# Patient Record
Sex: MALE | Race: WHITE | URBAN - METROPOLITAN AREA
[De-identification: names, ages, dates, MRNs, and addresses within clinical notes are randomized per-mention and may not be internally consistent; named-entity substitution may affect disease eponyms.]

---

## 2018-01-29 ENCOUNTER — NEW PATIENT (OUTPATIENT)
Dept: URBAN - METROPOLITAN AREA CLINIC 44 | Facility: CLINIC | Age: 83
End: 2018-01-29
Payer: OTHER GOVERNMENT

## 2018-01-29 DIAGNOSIS — H40.1134 PRIMARY OPEN-ANGLE GLAUCOMA, INDETERMINATE, BILATERAL: ICD-10-CM

## 2018-01-29 PROCEDURE — 92134 CPTRZ OPH DX IMG PST SGM RTA: CPT | Performed by: OPTOMETRIST

## 2018-01-29 PROCEDURE — 92004 COMPRE OPH EXAM NEW PT 1/>: CPT | Performed by: OPTOMETRIST

## 2018-01-29 ASSESSMENT — VISUAL ACUITY
OS: 20/50
OD: 20/30

## 2018-01-29 ASSESSMENT — KERATOMETRY
OD: 43.50
OS: 43.00

## 2018-01-29 ASSESSMENT — INTRAOCULAR PRESSURE
OD: 10
OS: 10

## 2018-01-30 ENCOUNTER — FOLLOW UP ESTABLISHED (OUTPATIENT)
Dept: URBAN - METROPOLITAN AREA CLINIC 44 | Facility: CLINIC | Age: 83
End: 2018-01-30
Payer: OTHER GOVERNMENT

## 2018-01-30 DIAGNOSIS — H43.391 OTHER VITREOUS OPACITIES, RIGHT EYE: Primary | ICD-10-CM

## 2018-01-30 DIAGNOSIS — T85.22XA DISPLACEMENT OF INTRAOCULAR LENS, INITIAL ENCOUNTER: ICD-10-CM

## 2018-01-30 PROCEDURE — 92004 COMPRE OPH EXAM NEW PT 1/>: CPT | Performed by: OPHTHALMOLOGY

## 2018-01-30 PROCEDURE — 92134 CPTRZ OPH DX IMG PST SGM RTA: CPT | Performed by: OPHTHALMOLOGY

## 2018-01-30 ASSESSMENT — INTRAOCULAR PRESSURE
OD: 12
OS: 11

## 2018-02-02 ENCOUNTER — DOCUMENTATION ONLY (OUTPATIENT)
Dept: FAMILY MEDICINE | Facility: OTHER | Age: 83
End: 2018-02-02

## 2018-02-02 RX ORDER — SIMVASTATIN 40 MG
40 TABLET ORAL AT BEDTIME
COMMUNITY
Start: 2014-09-23 | End: 2020-07-04

## 2018-02-02 RX ORDER — RISPERIDONE 4 MG/1
4 TABLET ORAL 2 TIMES DAILY
COMMUNITY
Start: 2014-09-23 | End: 2020-07-04 | Stop reason: DRUGHIGH

## 2018-02-02 RX ORDER — LEVOTHYROXINE SODIUM 100 UG/1
100 TABLET ORAL
COMMUNITY
Start: 2014-09-23 | End: 2020-07-04

## 2018-02-02 RX ORDER — AMITRIPTYLINE HYDROCHLORIDE 75 MG/1
75 TABLET ORAL AT BEDTIME
COMMUNITY
Start: 2014-09-23 | End: 2020-07-04 | Stop reason: DRUGHIGH

## 2018-02-02 RX ORDER — LAMOTRIGINE 200 MG/1
200 TABLET ORAL DAILY
COMMUNITY
Start: 2014-09-23 | End: 2020-07-04 | Stop reason: DRUGHIGH

## 2018-02-02 RX ORDER — ARIPIPRAZOLE 10 MG/1
10 TABLET ORAL DAILY
COMMUNITY
Start: 2014-09-23 | End: 2020-07-04

## 2018-02-02 RX ORDER — METOPROLOL SUCCINATE 200 MG/1
200 TABLET, EXTENDED RELEASE ORAL DAILY
COMMUNITY
Start: 2014-09-23 | End: 2020-07-04

## 2018-02-02 RX ORDER — GENTAMICIN SULFATE 1 MG/G
OINTMENT TOPICAL
COMMUNITY
Start: 2014-09-23 | End: 2020-07-04

## 2018-02-08 ENCOUNTER — FOLLOW UP ESTABLISHED (OUTPATIENT)
Dept: URBAN - METROPOLITAN AREA CLINIC 44 | Facility: CLINIC | Age: 83
End: 2018-02-08
Payer: OTHER GOVERNMENT

## 2018-02-08 PROCEDURE — 92014 COMPRE OPH EXAM EST PT 1/>: CPT | Performed by: OPHTHALMOLOGY

## 2018-02-08 RX ORDER — OFLOXACIN 3 MG/ML
0.3 % SOLUTION/ DROPS OPHTHALMIC
Qty: 1 | Refills: 0 | Status: INACTIVE
Start: 2018-02-08 | End: 2018-03-20

## 2018-02-08 RX ORDER — PREDNISOLONE ACETATE 10 MG/ML
1 % SUSPENSION/ DROPS OPHTHALMIC
Qty: 1 | Refills: 0 | Status: INACTIVE
Start: 2018-02-08 | End: 2020-02-08

## 2018-02-08 ASSESSMENT — INTRAOCULAR PRESSURE
OD: 14
OS: 15

## 2018-02-22 ENCOUNTER — Encounter (OUTPATIENT)
Dept: URBAN - METROPOLITAN AREA CLINIC 44 | Facility: CLINIC | Age: 83
End: 2018-02-22
Payer: OTHER GOVERNMENT

## 2018-02-22 DIAGNOSIS — Z01.818 ENCOUNTER FOR OTHER PREPROCEDURAL EXAMINATION: Primary | ICD-10-CM

## 2018-02-22 PROCEDURE — 99213 OFFICE O/P EST LOW 20 MIN: CPT | Performed by: PHYSICIAN ASSISTANT

## 2018-02-22 RX ORDER — SIMVASTATIN 40 MG/1
40 MG TABLET, FILM COATED ORAL
Qty: 0 | Refills: 0 | Status: INACTIVE
Start: 2018-02-22 | End: 2018-02-22

## 2018-02-22 RX ORDER — LAMOTRIGINE 200 MG/1
200 MG TABLET ORAL
Qty: 0 | Refills: 0 | Status: INACTIVE
Start: 2018-02-22 | End: 2018-08-17

## 2018-02-22 RX ORDER — RISPERIDONE 2 MG/1
2 MG TABLET, FILM COATED ORAL
Qty: 0 | Refills: 0 | Status: INACTIVE
Start: 2018-02-22 | End: 2018-04-11

## 2018-02-22 RX ORDER — AMITRIPTYLINE HYDROCHLORIDE 75 MG/1
75 MG TABLET, FILM COATED ORAL
Qty: 0 | Refills: 0 | Status: INACTIVE
Start: 2018-02-22 | End: 2018-08-17

## 2018-02-22 RX ORDER — METOPROLOL SUCCINATE 100 MG/1
100 MG TABLET, EXTENDED RELEASE ORAL
Qty: 0 | Refills: 0 | Status: INACTIVE
Start: 2018-02-22 | End: 2018-08-17

## 2018-03-07 ENCOUNTER — SURGERY (OUTPATIENT)
Dept: URBAN - METROPOLITAN AREA SURGERY 5 | Facility: SURGERY | Age: 83
End: 2018-03-07
Payer: OTHER GOVERNMENT

## 2018-03-07 PROCEDURE — 66825 REPOSITION INTRAOCULAR LENS: CPT | Performed by: OPHTHALMOLOGY

## 2018-03-07 PROCEDURE — 67036 REMOVAL OF INNER EYE FLUID: CPT | Performed by: OPHTHALMOLOGY

## 2018-03-08 ENCOUNTER — POST OP (OUTPATIENT)
Dept: URBAN - METROPOLITAN AREA CLINIC 44 | Facility: CLINIC | Age: 83
End: 2018-03-08

## 2018-03-08 PROCEDURE — 99024 POSTOP FOLLOW-UP VISIT: CPT | Performed by: OPTOMETRIST

## 2018-03-08 ASSESSMENT — INTRAOCULAR PRESSURE: OS: 13

## 2018-03-20 ENCOUNTER — POST OP (OUTPATIENT)
Dept: URBAN - METROPOLITAN AREA CLINIC 24 | Facility: CLINIC | Age: 83
End: 2018-03-20

## 2018-03-20 PROCEDURE — 99024 POSTOP FOLLOW-UP VISIT: CPT | Performed by: OPHTHALMOLOGY

## 2018-03-20 ASSESSMENT — INTRAOCULAR PRESSURE
OS: 24
OD: 17

## 2018-03-23 ENCOUNTER — HOSPITAL ENCOUNTER (OUTPATIENT)
Dept: CARDIOLOGY | Facility: OTHER | Age: 83
Discharge: HOME | End: 2018-03-23
Attending: NURSE PRACTITIONER | Admitting: NURSE PRACTITIONER
Payer: COMMERCIAL

## 2018-03-23 DIAGNOSIS — I35.1 NONRHEUMATIC AORTIC VALVE INSUFFICIENCY: ICD-10-CM

## 2018-03-23 PROCEDURE — 93306 TTE W/DOPPLER COMPLETE: CPT

## 2018-03-23 PROCEDURE — 93306 TTE W/DOPPLER COMPLETE: CPT | Mod: 26 | Performed by: INTERNAL MEDICINE

## 2018-03-23 PROCEDURE — 25500064 ZZH RX 255 OP 636

## 2018-03-23 RX ADMIN — PERFLUTREN 3 ML: 6.52 INJECTION, SUSPENSION INTRAVENOUS at 12:00

## 2018-05-11 ENCOUNTER — HOSPITAL ENCOUNTER (EMERGENCY)
Facility: OTHER | Age: 83
Discharge: HOME | End: 2018-05-11
Attending: PHYSICIAN ASSISTANT | Admitting: PHYSICIAN ASSISTANT
Payer: COMMERCIAL

## 2018-05-11 VITALS
TEMPERATURE: 95.8 F | SYSTOLIC BLOOD PRESSURE: 153 MMHG | WEIGHT: 162 LBS | BODY MASS INDEX: 23.19 KG/M2 | RESPIRATION RATE: 18 BRPM | DIASTOLIC BLOOD PRESSURE: 82 MMHG | OXYGEN SATURATION: 97 % | HEIGHT: 70 IN

## 2018-05-11 DIAGNOSIS — L03.119 CELLULITIS OF WRIST: ICD-10-CM

## 2018-05-11 PROCEDURE — 99283 EMERGENCY DEPT VISIT LOW MDM: CPT | Mod: Z6 | Performed by: PHYSICIAN ASSISTANT

## 2018-05-11 PROCEDURE — 99283 EMERGENCY DEPT VISIT LOW MDM: CPT | Performed by: PHYSICIAN ASSISTANT

## 2018-05-11 PROCEDURE — 87070 CULTURE OTHR SPECIMN AEROBIC: CPT | Performed by: PHYSICIAN ASSISTANT

## 2018-05-11 RX ORDER — SULFAMETHOXAZOLE/TRIMETHOPRIM 800-160 MG
1 TABLET ORAL 2 TIMES DAILY
Qty: 20 TABLET | Refills: 0 | Status: SHIPPED | OUTPATIENT
Start: 2018-05-11 | End: 2020-07-04

## 2018-05-11 RX ORDER — SULFAMETHOXAZOLE/TRIMETHOPRIM 800-160 MG
1 TABLET ORAL 2 TIMES DAILY
Qty: 20 TABLET | Refills: 0 | Status: SHIPPED | OUTPATIENT
Start: 2018-05-11 | End: 2018-05-11

## 2018-05-11 ASSESSMENT — ENCOUNTER SYMPTOMS
NECK STIFFNESS: 0
ABDOMINAL PAIN: 0
CONFUSION: 0
FEVER: 0
EYE REDNESS: 0
COLOR CHANGE: 0
SHORTNESS OF BREATH: 0
DIFFICULTY URINATING: 0
HEADACHES: 0
ARTHRALGIAS: 0
CHILLS: 0

## 2018-05-11 NOTE — ED AVS SNAPSHOT
[facility]    [address]    [place] [address]    Phone:  [phone]    Fax:  [phone]                                       Tashi Santana   MRN: [id number]    Department:  [facility]   Date of Visit:  5/11/2018           Patient Information     Date Of Birth          1935        Your diagnoses for this visit were:     Cellulitis of wrist right       You were seen by Omega Britton PA-C.      Follow-up Information     Follow up with Clinic, [facility]. Schedule an appointment as soon as possible for a visit in 1 week.    Why:  For wound re-check    Contact information:    [address] [address] [phone]          Discharge Instructions         Discharge Instructions for Cellulitis  You have been diagnosed with cellulitis. This is an infection in the deepest layer of the skin. In some cases, the infection also affects the muscle. Cellulitis is caused by bacteria. The bacteria can enter the body through broken skin. This can happen with a cut, scratch, animal bite, or an insect bite that has been scratched. You may have been treated in the hospital with antibiotics and fluids. You will likely be given a prescription for antibiotics to take at home. This sheet will help you take care of yourself at home.  Home care  When you are home:    Take the prescribed antibiotic medicine you are given as directed until it is gone. Take it even if you feel better. It treats the infection and stops it from returning. Not taking all the medicine can make future infections hard to treat.    Keep the infected area clean.    When possible, raise the infected area above the level of your heart. This helps keep swelling down.    Talk with your healthcare provider if you are in pain. Ask what kind of over-the-counter medicine you can take for pain.    Apply clean bandages as advised.    Take your temperature once a day for a  week.    Wash your hands often to prevent spreading the infection.  In the future, wash your hands before and after you touch cuts, scratches, or bandages. This will help prevent infection.   When to call your healthcare provider  Call your healthcare provider immediately if you have any of the following:    Difficulty or pain when moving the joints above or below the infected area    Discharge or pus draining from the area    Fever of 100.4 F (38 C) or higher, or as directed by your healthcare provider    Pain that gets worse in or around the infected     Redness that gets worse in or around the infected area, particularly if the area of redness expands to a wider area    Shaking chills    Swelling of the infected area    Vomiting   Date Last Reviewed: 8/1/2016 2000-2017 [employer]. [address] [place] [address], [place] [address]. All rights reserved. This information is not intended as a substitute for professional medical care. Always follow your healthcare professional's instructions.          24 Hour Appointment Hotline       To make an appointment at any [facility], call [phone] ([phone]). If you don't have a family doctor or clinic, we will help you find one. [facility] clinics are conveniently located to serve the needs of you and your family.             Review of your medicines      START taking        Dose / Directions Last dose taken    sulfamethoxazole-trimethoprim 800-160 MG per tablet   Commonly known as:  BACTRIM DS   Dose:  1 tablet   Quantity:  20 tablet        Take 1 tablet by mouth 2 times daily   Refills:  0          Our records show that you are taking the medicines listed below. If these are incorrect, please call your family doctor or clinic.        Dose / Directions Last dose taken    ALPRAZolam 0.25 MG tablet   Commonly known as:  XANAX   Dose:  0.25 mg   Quantity:  90 tablet        Take 1 tablet (0.25 mg) by mouth every 4 hours as needed for anxiety   Refills:   0        * amitriptyline 75 MG tablet   Commonly known as:  ELAVIL   Dose:  150 mg        Take 150 mg by mouth At Bedtime   Refills:  0        * amitriptyline 75 MG tablet   Commonly known as:  ELAVIL   Dose:  75 mg        Take 75 mg by mouth At Bedtime   Refills:  0        * ARIPiprazole 10 MG tablet   Commonly known as:  ABILIFY   Dose:  5 mg        Take 5 mg by mouth daily   Refills:  0        * ARIPiprazole 10 MG tablet   Commonly known as:  ABILIFY   Dose:  10 mg        Take 10 mg by mouth daily   Refills:  0        gentamicin 0.1 % ointment   Commonly known as:  GARAMYCIN        Refills:  0        * lamoTRIgine 200 MG tablet   Commonly known as:  LaMICtal   Dose:  200 mg        Take 200 mg by mouth 2 times daily.   Refills:  0        * lamoTRIgine 200 MG tablet   Commonly known as:  LaMICtal   Dose:  200 mg        Take 200 mg by mouth daily   Refills:  0        * levothyroxine 100 MCG tablet   Commonly known as:  SYNTHROID/LEVOTHROID   Dose:  100 mcg        Take 100 mcg by mouth daily.   Refills:  0        * levothyroxine 100 MCG tablet   Commonly known as:  SYNTHROID/LEVOTHROID   Dose:  100 mcg        Take 100 mcg by mouth every morning (before breakfast)   Refills:  0        * metoprolol succinate 200 MG 24 hr tablet   Commonly known as:  TOPROL-XL   Dose:  100 mg        Take 100 mg by mouth daily   Refills:  0        * metoprolol succinate 200 MG 24 hr tablet   Commonly known as:  TOPROL-XL   Dose:  200 mg        Take 200 mg by mouth daily   Refills:  0        * risperiDONE 4 MG tablet   Commonly known as:  risperDAL   Dose:  2 mg        Take 2 mg by mouth 2 times daily   Refills:  0        * risperiDONE 4 MG tablet   Commonly known as:  risperDAL   Dose:  4 mg        Take 4 mg by mouth 2 times daily   Refills:  0        * simvastatin 40 MG tablet   Commonly known as:  ZOCOR   Dose:  20 mg        Take 20 mg by mouth At Bedtime   Refills:  0        * simvastatin 40 MG tablet   Commonly known as:  ZOCOR    Dose:  40 mg        Take 40 mg by mouth At Bedtime   Refills:  0        * Notice:  This list has 14 medication(s) that are the same as other medications prescribed for you. Read the directions carefully, and ask your doctor or other care provider to review them with you.            Prescriptions were sent or printed at these locations (1 Prescription)                   [id number] - [place], [place] - [address]   [address] [place] [address]    Telephone:  [phone]   Fax:  [phone]   Hours:                  Printed at Department/Unit printer (1 of 1)         sulfamethoxazole-trimethoprim (BACTRIM DS) 800-160 MG per tablet                Procedures and tests performed during your visit     Wound Culture Aerobic Bacterial      Orders Needing Specimen Collection     None      Pending Results     No orders found from 5/9/2018 to 5/12/2018.            Pending Culture Results     No orders found from 5/9/2018 to 5/12/2018.            Pending Results Instructions     If you had any lab results that were not finalized at the time of your Discharge, you can call the ED Lab Result RN at [phone]. You will be contacted by this team for any positive Lab results or changes in treatment. The nurses are available 7 days a week from 10A to 6:30P.  You can leave a message 24 hours per day and they will return your call.        Thank you for choosing [facility]       Thank you for choosing [facility] for your care. Our goal is always to provide you with excellent care. Hearing back from our patients is one way we can continue to improve our services. Please take a few minutes to complete the written survey that you may receive in the mail after you visit with us. Thank you!        [employer] Information     [employer] lets you send messages to your doctor, view your test results, renew your prescriptions, schedule appointments and more. To sign up, go to [email or web address] .  "Click on \"Log in\" on the left side of the screen, which will take you to the Welcome page. Then click on \"Sign up Now\" on the right side of the page.     You will be asked to enter the access code listed below, as well as some personal information. Please follow the directions to create your username and password.     Your access code is: [email or web address]  Expires: 2018  6:03 PM     Your access code will  in 90 days. If you need help or a new code, please call your [facility] clinic or [phone].        Care EveryWhere ID     This is your Care EveryWhere ID. This could be used by other organizations to access your [facility] medical records  [id number]        Equal Access to Services     JUSTIN NARAYAN : Tristen Leyva, félix barrientos, bharathi jordan, karrie obrien. So [facility] [phone].    ATENCIÓN: Si habla español, tiene a dejesus disposición servicios gratuitos de asistencia lingüística. Llame al [phone].    We comply with applicable federal civil rights laws and [place] laws. We do not discriminate on the basis of race, color, national origin, age, disability, sex, sexual orientation, or gender identity.            After Visit Summary       This is your record. Keep this with you and show to your community pharmacist(s) and doctor(s) at your next visit.                  "

## 2018-05-11 NOTE — DISCHARGE INSTRUCTIONS
Discharge Instructions for Cellulitis  You have been diagnosed with cellulitis. This is an infection in the deepest layer of the skin. In some cases, the infection also affects the muscle. Cellulitis is caused by bacteria. The bacteria can enter the body through broken skin. This can happen with a cut, scratch, animal bite, or an insect bite that has been scratched. You may have been treated in the hospital with antibiotics and fluids. You will likely be given a prescription for antibiotics to take at home. This sheet will help you take care of yourself at home.  Home care  When you are home:    Take the prescribed antibiotic medicine you are given as directed until it is gone. Take it even if you feel better. It treats the infection and stops it from returning. Not taking all the medicine can make future infections hard to treat.    Keep the infected area clean.    When possible, raise the infected area above the level of your heart. This helps keep swelling down.    Talk with your healthcare provider if you are in pain. Ask what kind of over-the-counter medicine you can take for pain.    Apply clean bandages as advised.    Take your temperature once a day for a week.    Wash your hands often to prevent spreading the infection.  In the future, wash your hands before and after you touch cuts, scratches, or bandages. This will help prevent infection.   When to call your healthcare provider  Call your healthcare provider immediately if you have any of the following:    Difficulty or pain when moving the joints above or below the infected area    Discharge or pus draining from the area    Fever of 100.4 F (38 C) or higher, or as directed by your healthcare provider    Pain that gets worse in or around the infected     Redness that gets worse in or around the infected area, particularly if the area of redness expands to a wider area    Shaking chills    Swelling of the infected area    Vomiting   Date Last Reviewed:  8/1/2016 2000-2017 [employer]. [address] [place] [address], [place] [address]. All rights reserved. This information is not intended as a substitute for professional medical care. Always follow your healthcare professional's instructions.

## 2018-05-11 NOTE — ED PROVIDER NOTES
History     Chief Complaint   Patient presents with     Wound Check     HPI Comments: This is a 82 yo male who [facility]  He has noticed some continued redness and drainage at time.  He thinks this is pus.  Denies any fever or chills.  He is here for further evaluation at this time.     The history is provided by the patient.       Problem List:    Patient Active Problem List    Diagnosis Date Noted     Generalized muscle weakness 07/18/2014     Priority: Medium     Weakness 07/18/2014     Priority: Medium     Squamous cell carcinoma of skin of face 07/06/2012     Priority: Medium     Problem list name updated by automated process. Provider to review          Past Medical History:    No past medical history on file.    Past Surgical History:    No past surgical history on file.    Family History:    No family history on file.    Social History:  Marital Status:   [2]  Social History   Substance Use Topics     Smoking status: Former Smoker     Smokeless tobacco: Never Used     Alcohol use No        Medications:      sulfamethoxazole-trimethoprim (BACTRIM DS) 800-160 MG per tablet   ALPRAZolam (XANAX) 0.25 MG tablet   amitriptyline (ELAVIL) 75 MG tablet   amitriptyline (ELAVIL) 75 MG tablet   ARIPiprazole (ABILIFY) 10 MG tablet   ARIPiprazole (ABILIFY) 10 MG tablet   gentamicin (GARAMYCIN) 0.1 % ointment   lamoTRIgine (LAMICTAL) 200 MG tablet   lamoTRIgine (LAMICTAL) 200 MG tablet   levothyroxine (SSYNTHROID,LEVOTHROID) 100 MCG tablet   levothyroxine (SYNTHROID/LEVOTHROID) 100 MCG tablet   metoprolol (TOPROL-XL) 200 MG 24 hr tablet   metoprolol succinate (TOPROL-XL) 200 MG 24 hr tablet   risperiDONE (RISPERDAL) 4 MG tablet   risperiDONE (RISPERDAL) 4 MG tablet   simvastatin (ZOCOR) 40 MG tablet   simvastatin (ZOCOR) 40 MG tablet         Review of Systems   Constitutional: Negative for chills and fever.   HENT: Negative for congestion.    Eyes:  "Negative for redness.   Respiratory: Negative for shortness of breath.    Cardiovascular: Negative for chest pain.   Gastrointestinal: Negative for abdominal pain.   Genitourinary: Negative for difficulty urinating.   Musculoskeletal: Negative for arthralgias and neck stiffness.        Right wrist redness and drainage   Skin: Negative for color change.   Neurological: Negative for headaches.   Psychiatric/Behavioral: Negative for confusion.       Physical Exam   BP: 153/82  Heart Rate: 71  Temp: 95.8  F (35.4  C)  Resp: 18  Height: 177.8 cm (5' 10\")  Weight: 73.5 kg (162 lb)  SpO2: 97 %      Physical Exam   Constitutional: He is oriented to person, place, and time. No distress.   HENT:   Head: Atraumatic.   Mouth/Throat: Oropharynx is clear and moist. No oropharyngeal exudate.   Eyes: Pupils are equal, round, and reactive to light. No scleral icterus.   Cardiovascular: Normal heart sounds and intact distal pulses.    Pulmonary/Chest: Breath sounds normal. No respiratory distress.   Abdominal: Soft. Bowel sounds are normal. There is no tenderness.   Musculoskeletal: He exhibits no edema or tenderness.   Right wrist dorsal skin punch biopsy with minimal drainage and erythema.  WC obtained and pending.  CMSx4   Neurological: He is alert and oriented to person, place, and time.   Skin: Skin is warm. No rash noted. He is not diaphoretic.       ED Course     ED Course     Procedures           WC obtained and pending.   No results found for this or any previous visit (from the past 24 hour(s)).    Medications - No data to display    Assessments & Plan (with Medical Decision Making)     I have reviewed the nursing notes.    I have reviewed the findings, diagnosis, plan and need for follow up with the patient.      New Prescriptions    SULFAMETHOXAZOLE-TRIMETHOPRIM (BACTRIM DS) 800-160 MG PER TABLET    Take 1 tablet by mouth 2 times daily       Final diagnoses:   Cellulitis of wrist - right     Right wrist dorsal cellulitis " from recent punch biopsy.  WC obtained and pending.  Will start him on bactrim in the interim.  Continue to monitor and follow up for a wound check in one week, sooner if there are any concerns.   5/11/2018   [facility]  05/11/18 1811

## 2018-05-11 NOTE — ED AVS SNAPSHOT
[facility]    [address]    [place] [address]    Phone:  [phone]    Fax:  [phone]                                       Tashi Santana   MRN: [id number]    Department:  [facility]   Date of Visit:  5/11/2018           After Visit Summary Signature Page     I have received my discharge instructions, and my questions have been answered. I have discussed any challenges I see with this plan with the nurse or doctor.    ..........................................................................................................................................  Patient/Patient Representative Signature      ..........................................................................................................................................  Patient Representative Print Name and Relationship to Patient    ..................................................               ................................................  Date                                            Time    ..........................................................................................................................................  Reviewed by Signature/Title    ...................................................              ..............................................  Date                                                            Time

## 2018-05-11 NOTE — ED TRIAGE NOTES
Pt reports he had a biopsy on a sore and possible cancerous spot on his L wrist on May 1. He has been getting some pus from the biopsy site and is worried about the sore getting bigger. The wound is also pink, which is new within the past few days. The site is scabbed.

## 2018-05-13 LAB
BACTERIA SPEC CULT: NORMAL
SPECIMEN SOURCE: NORMAL

## 2018-05-18 ENCOUNTER — APPOINTMENT (OUTPATIENT)
Dept: GENERAL RADIOLOGY | Facility: OTHER | Age: 83
End: 2018-05-18
Attending: FAMILY MEDICINE
Payer: COMMERCIAL

## 2018-05-18 ENCOUNTER — HOSPITAL ENCOUNTER (EMERGENCY)
Facility: OTHER | Age: 83
Discharge: ANOTHER HOSPITAL | End: 2018-05-18
Attending: FAMILY MEDICINE | Admitting: FAMILY MEDICINE
Payer: COMMERCIAL

## 2018-05-18 ENCOUNTER — APPOINTMENT (OUTPATIENT)
Dept: CT IMAGING | Facility: OTHER | Age: 83
End: 2018-05-18
Attending: EMERGENCY MEDICINE
Payer: COMMERCIAL

## 2018-05-18 VITALS
RESPIRATION RATE: 23 BRPM | HEART RATE: 74 BPM | BODY MASS INDEX: 23.19 KG/M2 | TEMPERATURE: 96.7 F | OXYGEN SATURATION: 96 % | WEIGHT: 162 LBS | HEIGHT: 70 IN | SYSTOLIC BLOOD PRESSURE: 133 MMHG | DIASTOLIC BLOOD PRESSURE: 76 MMHG

## 2018-05-18 DIAGNOSIS — R06.09 EXERTIONAL DYSPNEA: ICD-10-CM

## 2018-05-18 DIAGNOSIS — R29.6 FALLS FREQUENTLY: ICD-10-CM

## 2018-05-18 DIAGNOSIS — N28.89 LEFT RENAL MASS: ICD-10-CM

## 2018-05-18 LAB
ALBUMIN SERPL-MCNC: 4.2 G/DL (ref 3.5–5.7)
ALBUMIN UR-MCNC: NEGATIVE MG/DL
ALP SERPL-CCNC: 102 U/L (ref 34–104)
ALT SERPL W P-5'-P-CCNC: 16 U/L (ref 7–52)
ANION GAP SERPL CALCULATED.3IONS-SCNC: 8 MMOL/L (ref 3–14)
APPEARANCE UR: CLEAR
AST SERPL W P-5'-P-CCNC: 31 U/L (ref 13–39)
BASOPHILS # BLD AUTO: 0 10E9/L (ref 0–0.2)
BASOPHILS NFR BLD AUTO: 0.5 %
BILIRUB SERPL-MCNC: 0.7 MG/DL (ref 0.3–1)
BILIRUB UR QL STRIP: NEGATIVE
BUN SERPL-MCNC: 16 MG/DL (ref 7–25)
CALCIUM SERPL-MCNC: 9.8 MG/DL (ref 8.6–10.3)
CHLORIDE SERPL-SCNC: 102 MMOL/L (ref 98–107)
CO2 SERPL-SCNC: 25 MMOL/L (ref 21–31)
COLOR UR AUTO: YELLOW
CREAT SERPL-MCNC: 1.28 MG/DL (ref 0.7–1.3)
DIFFERENTIAL METHOD BLD: NORMAL
EOSINOPHIL # BLD AUTO: 0.2 10E9/L (ref 0–0.7)
EOSINOPHIL NFR BLD AUTO: 2.6 %
ERYTHROCYTE [DISTWIDTH] IN BLOOD BY AUTOMATED COUNT: 13 % (ref 10–15)
GFR SERPL CREATININE-BSD FRML MDRD: 54 ML/MIN/1.7M2
GLUCOSE SERPL-MCNC: 103 MG/DL (ref 70–105)
GLUCOSE UR STRIP-MCNC: NEGATIVE MG/DL
HCT VFR BLD AUTO: 41.4 % (ref 40–53)
HGB BLD-MCNC: 14 G/DL (ref 13.3–17.7)
HGB UR QL STRIP: NEGATIVE
IMM GRANULOCYTES # BLD: 0.1 10E9/L (ref 0–0.4)
IMM GRANULOCYTES NFR BLD: 1.2 %
INR PPP: 1.05 (ref 0–1.3)
KETONES UR STRIP-MCNC: NEGATIVE MG/DL
LACTATE SERPL-SCNC: 1.1 MMOL/L (ref 0.5–2.2)
LEUKOCYTE ESTERASE UR QL STRIP: NEGATIVE
LYMPHOCYTES # BLD AUTO: 1.3 10E9/L (ref 0.8–5.3)
LYMPHOCYTES NFR BLD AUTO: 19.3 %
MCH RBC QN AUTO: 30.4 PG (ref 26.5–33)
MCHC RBC AUTO-ENTMCNC: 33.8 G/DL (ref 31.5–36.5)
MCV RBC AUTO: 90 FL (ref 78–100)
MONOCYTES # BLD AUTO: 0.7 10E9/L (ref 0–1.3)
MONOCYTES NFR BLD AUTO: 10.5 %
NEUTROPHILS # BLD AUTO: 4.3 10E9/L (ref 1.6–8.3)
NEUTROPHILS NFR BLD AUTO: 65.9 %
NITRATE UR QL: NEGATIVE
PH UR STRIP: 5.5 PH (ref 5–7)
PLATELET # BLD AUTO: 161 10E9/L (ref 150–450)
POTASSIUM SERPL-SCNC: 4.3 MMOL/L (ref 3.5–5.1)
PROT SERPL-MCNC: 7.2 G/DL (ref 6.4–8.9)
RBC # BLD AUTO: 4.6 10E12/L (ref 4.4–5.9)
SODIUM SERPL-SCNC: 135 MMOL/L (ref 134–144)
SOURCE: NORMAL
SP GR UR STRIP: 1.01 (ref 1–1.03)
TROPONIN I SERPL-MCNC: 0.04 UG/L (ref 0–0.03)
TROPONIN I SERPL-MCNC: 0.04 UG/L (ref 0–0.03)
UROBILINOGEN UR STRIP-ACNC: 0.2 EU/DL (ref 0.2–1)
WBC # BLD AUTO: 6.6 10E9/L (ref 4–11)

## 2018-05-18 PROCEDURE — 36415 COLL VENOUS BLD VENIPUNCTURE: CPT | Performed by: EMERGENCY MEDICINE

## 2018-05-18 PROCEDURE — 25000128 H RX IP 250 OP 636: Performed by: EMERGENCY MEDICINE

## 2018-05-18 PROCEDURE — 84484 ASSAY OF TROPONIN QUANT: CPT | Performed by: FAMILY MEDICINE

## 2018-05-18 PROCEDURE — 96375 TX/PRO/DX INJ NEW DRUG ADDON: CPT | Performed by: FAMILY MEDICINE

## 2018-05-18 PROCEDURE — 84484 ASSAY OF TROPONIN QUANT: CPT | Performed by: EMERGENCY MEDICINE

## 2018-05-18 PROCEDURE — 83605 ASSAY OF LACTIC ACID: CPT | Performed by: FAMILY MEDICINE

## 2018-05-18 PROCEDURE — 96374 THER/PROPH/DIAG INJ IV PUSH: CPT | Mod: XU | Performed by: FAMILY MEDICINE

## 2018-05-18 PROCEDURE — 71045 X-RAY EXAM CHEST 1 VIEW: CPT

## 2018-05-18 PROCEDURE — 85610 PROTHROMBIN TIME: CPT | Mod: GZ | Performed by: FAMILY MEDICINE

## 2018-05-18 PROCEDURE — 81003 URINALYSIS AUTO W/O SCOPE: CPT | Performed by: FAMILY MEDICINE

## 2018-05-18 PROCEDURE — 93010 ELECTROCARDIOGRAM REPORT: CPT | Performed by: INTERNAL MEDICINE

## 2018-05-18 PROCEDURE — 87040 BLOOD CULTURE FOR BACTERIA: CPT | Mod: 91 | Performed by: FAMILY MEDICINE

## 2018-05-18 PROCEDURE — 71260 CT THORAX DX C+: CPT

## 2018-05-18 PROCEDURE — 93005 ELECTROCARDIOGRAM TRACING: CPT | Performed by: FAMILY MEDICINE

## 2018-05-18 PROCEDURE — 72125 CT NECK SPINE W/O DYE: CPT

## 2018-05-18 PROCEDURE — 85025 COMPLETE CBC W/AUTO DIFF WBC: CPT | Performed by: FAMILY MEDICINE

## 2018-05-18 PROCEDURE — 71120 X-RAY EXAM BREASTBONE 2/>VWS: CPT

## 2018-05-18 PROCEDURE — 99284 EMERGENCY DEPT VISIT MOD MDM: CPT | Mod: Z6 | Performed by: FAMILY MEDICINE

## 2018-05-18 PROCEDURE — 70450 CT HEAD/BRAIN W/O DYE: CPT

## 2018-05-18 PROCEDURE — 80053 COMPREHEN METABOLIC PANEL: CPT | Performed by: FAMILY MEDICINE

## 2018-05-18 PROCEDURE — 25000128 H RX IP 250 OP 636: Performed by: FAMILY MEDICINE

## 2018-05-18 PROCEDURE — 36415 COLL VENOUS BLD VENIPUNCTURE: CPT | Performed by: FAMILY MEDICINE

## 2018-05-18 PROCEDURE — 99285 EMERGENCY DEPT VISIT HI MDM: CPT | Mod: 25 | Performed by: FAMILY MEDICINE

## 2018-05-18 RX ORDER — FENTANYL CITRATE 50 UG/ML
50 INJECTION, SOLUTION INTRAMUSCULAR; INTRAVENOUS
Status: DISCONTINUED | OUTPATIENT
Start: 2018-05-18 | End: 2018-05-19 | Stop reason: HOSPADM

## 2018-05-18 RX ORDER — NALOXONE HYDROCHLORIDE 0.4 MG/ML
.1-.4 INJECTION, SOLUTION INTRAMUSCULAR; INTRAVENOUS; SUBCUTANEOUS
Status: DISCONTINUED | OUTPATIENT
Start: 2018-05-18 | End: 2018-05-19 | Stop reason: HOSPADM

## 2018-05-18 RX ORDER — IODIXANOL 320 MG/ML
100 INJECTION, SOLUTION INTRAVASCULAR ONCE
Status: COMPLETED | OUTPATIENT
Start: 2018-05-18 | End: 2018-05-18

## 2018-05-18 RX ORDER — ONDANSETRON 2 MG/ML
4 INJECTION INTRAMUSCULAR; INTRAVENOUS ONCE
Status: COMPLETED | OUTPATIENT
Start: 2018-05-18 | End: 2018-05-18

## 2018-05-18 RX ADMIN — FENTANYL CITRATE 50 MCG: 50 INJECTION, SOLUTION INTRAMUSCULAR; INTRAVENOUS at 22:32

## 2018-05-18 RX ADMIN — ONDANSETRON 4 MG: 2 INJECTION INTRAMUSCULAR; INTRAVENOUS at 20:28

## 2018-05-18 RX ADMIN — IODIXANOL 100 ML: 320 INJECTION, SOLUTION INTRAVASCULAR at 20:23

## 2018-05-18 NOTE — ED TRIAGE NOTES
Pt here by meds 1 into bay 3, pt has been having frequent falls at home, pt fell today, lost his balance, and is c/o chest pain and rt hip pain VSS, pt grimaces with any movement, pt placed in gown, pt was given IV fentany 50 mcg per EMSl

## 2018-05-18 NOTE — ED PROVIDER NOTES
History     Chief Complaint   Patient presents with     Fall     HPI  Tashi Santana is a 83 year old male who had another fall today.  He has evidently been falling frequently, up to 5 times this week.  Today he felt like his legs were going to give way, which they did, and he fell forward onto his commode.  He struck right at the sternum, then fell sideways, and mildly injured his right hip area.  An ambulance was called, and he was given fentanyl for pain, and deemed stable for transport here.    Recently he denies being unwell with fevers, chills, headache, SOB, cough, chest pains, abdominal concerns, N/V/D, urinary symptoms.    We do not appear to have a full medical hx on this patient on [facility].    Problem List:    Patient Active Problem List    Diagnosis Date Noted     Generalized muscle weakness 07/18/2014     Priority: Medium     Weakness 07/18/2014     Priority: Medium     Squamous cell carcinoma of skin of face 07/06/2012     Priority: Medium     Problem list name updated by automated process. Provider to review          Past Medical History:    No past medical history on file.    Past Surgical History:    No past surgical history on file.    Family History:    No family history on file.    Social History:  Marital Status:   [2]  Social History   Substance Use Topics     Smoking status: Former Smoker     Smokeless tobacco: Never Used     Alcohol use No        Medications:      ALPRAZolam (XANAX) 0.25 MG tablet   amitriptyline (ELAVIL) 75 MG tablet   amitriptyline (ELAVIL) 75 MG tablet   ARIPiprazole (ABILIFY) 10 MG tablet   ARIPiprazole (ABILIFY) 10 MG tablet   gentamicin (GARAMYCIN) 0.1 % ointment   lamoTRIgine (LAMICTAL) 200 MG tablet   lamoTRIgine (LAMICTAL) 200 MG tablet   levothyroxine (SSYNTHROID,LEVOTHROID) 100 MCG tablet   levothyroxine (SYNTHROID/LEVOTHROID) 100 MCG tablet   metoprolol (TOPROL-XL) 200 MG 24 hr tablet   metoprolol succinate (TOPROL-XL) 200 MG 24 hr tablet   risperiDONE  "(RISPERDAL) 4 MG tablet   risperiDONE (RISPERDAL) 4 MG tablet   simvastatin (ZOCOR) 40 MG tablet   simvastatin (ZOCOR) 40 MG tablet   sulfamethoxazole-trimethoprim (BACTRIM DS) 800-160 MG per tablet         Review of Systemsas above    Physical Exam   BP: 123/78  Pulse: 74  Temp: 96.7  F (35.9  C)  Resp: 18  Height: 177.8 cm (5' 10\")  Weight: 73.5 kg (162 lb)  SpO2: (!) 85 %      Physical Exam  Well man.  GCS 15.  He mentates clearly, awake, conversant.  Pulse ox 88-90%, suspect due to fentanyl.  He does not appear head injured.  He has no spinal tenderness or back tenderness.  Heart has an unusual S1 sound that he attributes to a 'leaky valve'.  Lungs clear posteriorly, no rales, no tachypnea.  abd soft, NT, ND, NABS.  He has no ribcage tenderness but his sternum is tender.  No pelvic tenderness.  No injuries noted to the clavicle, shoulder, arms, legs, or hip joint, which moves well and is nontender.  Extremities yield no focal neurologic deficits.  I doubt he has sustained a stroke.    ED Course     ED Course     Procedures        EKG paced, but computer suggesting there could be an element of pacemaker failure.  Unclear to me.  On the 3-lead in the room, I see no evidence of failure to capture.  However,  His falling/fainting spells could certainly be cardiogenic in nature (pacemaker failure) and could explain why his troponin is slightly high.  Will discuss with .  [facility], so this could be a complicated scenario to organize if thought to be cardiogenic.           Critical Care time:  none   troponin slightly high, unclear cause.       Diagnostics otherwise reassuring, with no evidence of infection like UTI or pneumonia.    CXR on my read shows cardiomegaly, with pacemaker noted, and right--sided streaking midlung that I would not call pneumonia.  Radiologist reading this streak as atelectasis v pneumonia v contusion.  He has had no recent symptoms of pneumonia (fever, cough, " dajuanors, SOB).    Sternum xrays show a mid-sternal fracture.       No results found for this or any previous visit (from the past 24 hour(s)).    Medications   fentaNYL (PF) (SUBLIMAZE) injection 50 mcg (not administered)       Assessments & Plan (with Medical Decision Making)     I have reviewed the nursing notes.    I have reviewed the findings, diagnosis, plan and need for follow up with the patient.   there will be a shift change at 7p.  Please refer to 's note for further details on disposition and treatment plan.     As of 6:45p going to [facility].  Will likely be talking to .       New Prescriptions    No medications on file       Final diagnoses:   Falls frequently       5/18/2018   [facility]     Demetrius Damon MD  05/18/18 1753       Demetrius Damon MD  05/18/18 1836       Demetrius Damon MD  05/18/18 1842       Demetrius Damon MD  05/18/18 1844

## 2018-05-19 NOTE — ED PROVIDER NOTES
"This patient's care will sign off to me by Dr. Turner Damon shift change this evening.  Recently patient is telling me that for monuments and maybe even years he has not been active usually is sitting on a chair.  Patient tells me he has known he has a distant heart failure over 10 years with exertional shortness of breath.  He also tells me about a year and half ago he was told that he had \"a leaky heart valve\" which he also feels has significantly contributed to his exertional shortness of breath and fatigue.  For about a week now patient has been having frequent falls.  He feels that he just loses his balance and falls down.  He says he has no palpitations, chest or abdominal pains, dizziness or lightheadedness when this happens.  Today he fell down while he was trying to maneuver his body in the bathroom try to sit down on the toilet.  He states he must have hit his chest over the toilet seat.  He sustained midsternal fracture.  This suggests significant amount of trauma to his chest with potential other injuries to his body especially when he has a distracting pain resulting from the sternal fracture.  Therefore CT head, neck chest, abdomen and pelvis.  He does tell me he has pain at the upper thoracic area on the back as well as some right-sided pelvic discomfort during hip manipulation.  CTs reports are as follows:    PROCEDURE: CT CHEST/ABDOMEN/PELVIS W CONTRAST 5/18/2018 8:23 PM     HISTORY: traunmatic fall with pain;      COMPARISONS: None.     Meds/Dose Given: visipaque 320     TECHNIQUE: CT scan of the chest abdomen and pelvis IV contrast     FINDINGS: There Is mild deformity of the upper sternum on the CT scan  this appears more chronic than acute. There are multiple thoracic  compression fractures of uncertain age. There is mild interstitial  thickening seen. No pneumothorax or pleural effusion is noted. The  mediastinum is free of hematomas. There are some calcified mediastinal  lymph nodes and left " hilar lymph nodes. The heart is normal in size  and is no pericardial effusion.     CT scan of the abdomen and pelvis: There is a low-density lesion in  the left lower liver most likely a cyst. No gallstones are seen. The  spleen appears normal. The pancreas is normal. There are no adrenal  masses. There is a small enhancing mass arising from the posterior  inferior aspect of the left kidney. This mass measures 12 mm in  diameter and is suspicious for malignancy. There is benign cysts seen  in the left kidney. The periaortic lymph nodes are normal in caliber.  In the pelvis the bladder and rectum appear normal. No free air or  free fluid in the abdomen is seen. There are degenerative changes  present in the lower lumbar spine. No pelvic fracture is seen.          IMPRESSION:   1. Mild sternal deformity most likely old.  2. Multiple thoracic compression fractures of uncertain age  3. Enhancing mass in the lower pole of the left kidney suspicious for  malignancy     PROCEDURE: CT CERVICAL SPINE W/O CONTRAST 5/18/2018 8:24 PM     HISTORY: traumatic fall;      COMPARISONS: None.     Meds/Dose Given:     TECHNIQUE: CT scan of the cervical spine with sagittal coronal  reconstructions     FINDINGS: . There is decrease in height in the C3-C4 C5-C6 and C6-C7  discs. There are facet joint degenerative changes in the upper  cervical spine. There is a mild wedge compression fracture of the T3  vertebra of uncertain if this is old or new. Prevertebral soft tissues  appear normal.          IMPRESSION: Mild T3 compression fracture of uncertain age.  2. Advanced degenerative changes in cervical spine.  3. No cervical fracture.     HETAL BLACKMON MD    PROCEDURE: CT HEAD W/O CONTRAST      HISTORY: frequent fall s/p traumatic fall; .     COMPARISON: None.     TECHNIQUE:  Helical images of the head from the foramen magnum to the  vertex were obtained without contrast.     FINDINGS: The ventricles and sulci are normal in volume. No  acute  intracranial hemorrhage, mass effect, midline shift, hydrocephalus or  basilar cystern effacement are present.     The grey-white matter interface is preserved.     The calvarium is intact. The mastoid air cells are clear.  The  visualized paranasal sinuses are clear.         IMPRESSION: No acute brain abnormality       HETAL BLACKMON MD    PROCEDURE: XR STERNUM 2 VW 5/18/2018 6:23 PM     HISTORY: fall onto sternum;      COMPARISONS: None.     TECHNIQUE: AP and lateral     FINDINGS: There is focal deformity of the mid sternum consistent with  a sternal fracture. Portions of the sternum were not visualized due to  the overlying projection of the pacemaker.          IMPRESSION: Midsternal fracture     HETAL BLACKMON MD    PROCEDURE:  XR CHEST 1 VW     HISTORY:  trauma, chest pain; .      COMPARISON:  None.     FINDINGS:   Heart is enlarged. Is a transvenous pacemaker in place. The pulmonary  vasculature is normal.  Is some increased density in the right lung  consistent with atelectasis or pneumonia or contusion. No pleural  effusion or pneumothorax. Is a soft tissue calcification adjacent to  the greater tubercle: The right consistent with calcific tendinitis or  bursitis.         IMPRESSION:  Cardiomegaly. Right lung opacity consistent with  atelectasis or pneumonia       HETAL BLACKMON MD    There is a concern that patient's pacemaker may be malfunctioning and therefore contributing to patient having frequent falls.  Therefore cardiology service at [facility] in [place]. Dr. Galeas recommends patient be transferred to [place] the hospitalist for admission and further cardiac consultation.  Overall patient's frequent falls is likely multifactorial; he certainly seems to be having increased deconditioning resulting patient just basically sitting down and not being active which may be related to his history of heart failure with valvular heart disease both of which could be worsening.  In addition  patient states his appetite has been poor and he lost significant amount of weight.  He denies known history of cancer difficulty swallowing.  However the CT abdomen does reveal 1.2 cm mass suspicious for malignancy at the inferior pole of the left kidney.  It is unlikely that this is contributing to the patient's symptoms given its size.  Medical record from [facility] requested but we have been he had gotten it.  Workup finding and plan of care thoroughly discussed with the patient and his wife and they are in agreement.          Armando Ch MD  05/18/18 2213       Armando Ch MD  05/18/18 2214       Armando Ch MD  05/18/18 2220

## 2018-05-19 NOTE — ED NOTES
[employer] pac[employer] contacted about possible interrogation, pt denies any new complaints, does not want anything else for pain at this time, o2 sats decreased to 85%, o2 applied at 2 liters

## 2018-05-25 LAB
BACTERIA SPEC CULT: NORMAL
BACTERIA SPEC CULT: NORMAL
SPECIMEN SOURCE: NORMAL
SPECIMEN SOURCE: NORMAL

## 2018-08-08 RX ORDER — SIMVASTATIN 20 MG
TABLET ORAL
Refills: 99 | COMMUNITY
Start: 2018-05-30 | End: 2020-07-04

## 2018-08-08 RX ORDER — AMITRIPTYLINE HYDROCHLORIDE 50 MG/1
50 TABLET ORAL AT BEDTIME
Refills: 99 | COMMUNITY
Start: 2018-05-30 | End: 2020-07-04 | Stop reason: DRUGHIGH

## 2018-08-08 RX ORDER — LEVOTHYROXINE SODIUM 88 UG/1
TABLET ORAL
Refills: 99 | COMMUNITY
Start: 2018-05-30 | End: 2020-07-04 | Stop reason: DRUGHIGH

## 2018-08-08 RX ORDER — DORZOLAMIDE HYDROCHLORIDE AND TIMOLOL MALEATE 20; 5 MG/ML; MG/ML
SOLUTION/ DROPS OPHTHALMIC
Refills: 99 | COMMUNITY
Start: 2018-05-23

## 2018-08-08 RX ORDER — METOPROLOL SUCCINATE 100 MG/1
TABLET, EXTENDED RELEASE ORAL
Refills: 99 | COMMUNITY
Start: 2018-05-30 | End: 2020-07-04

## 2018-08-08 RX ORDER — RISPERIDONE 2 MG/1
TABLET ORAL
Refills: 99 | COMMUNITY
Start: 2018-05-30 | End: 2020-07-04

## 2018-08-08 RX ORDER — BRIMONIDINE TARTRATE 2 MG/ML
SOLUTION/ DROPS OPHTHALMIC
Refills: 99 | COMMUNITY
Start: 2018-05-23

## 2018-08-08 RX ORDER — ARIPIPRAZOLE 5 MG/1
TABLET ORAL
Refills: 99 | COMMUNITY
Start: 2018-05-30 | End: 2020-07-04

## 2020-02-08 ENCOUNTER — FOLLOW UP ESTABLISHED (OUTPATIENT)
Dept: URBAN - METROPOLITAN AREA CLINIC 44 | Facility: CLINIC | Age: 85
End: 2020-02-08
Payer: COMMERCIAL

## 2020-02-08 DIAGNOSIS — H25.811 COMBINED FORMS OF AGE-RELATED CATARACT, RIGHT EYE: Primary | ICD-10-CM

## 2020-02-08 DIAGNOSIS — T85.22XD DISPLACEMENT OF INTRAOCULAR LENS, SUBSEQUENT ENCOUNTER: ICD-10-CM

## 2020-02-08 DIAGNOSIS — H33.052 TOTAL RETINAL DETACHMENT, LEFT EYE: ICD-10-CM

## 2020-02-08 PROCEDURE — 92014 COMPRE OPH EXAM EST PT 1/>: CPT | Performed by: OPTOMETRIST

## 2020-02-08 PROCEDURE — 92134 CPTRZ OPH DX IMG PST SGM RTA: CPT | Performed by: OPTOMETRIST

## 2020-02-08 PROCEDURE — 92133 CPTRZD OPH DX IMG PST SGM ON: CPT | Performed by: OPTOMETRIST

## 2020-02-08 ASSESSMENT — INTRAOCULAR PRESSURE
OS: 9
OD: 14

## 2020-02-08 ASSESSMENT — KERATOMETRY
OS: 42.88
OD: 43.50

## 2020-02-08 ASSESSMENT — VISUAL ACUITY
OD: 20/40
OS: 20/50

## 2020-07-04 ENCOUNTER — APPOINTMENT (OUTPATIENT)
Dept: CT IMAGING | Facility: OTHER | Age: 85
End: 2020-07-04
Attending: FAMILY MEDICINE
Payer: COMMERCIAL

## 2020-07-04 ENCOUNTER — HOSPITAL ENCOUNTER (EMERGENCY)
Facility: OTHER | Age: 85
Discharge: ANOTHER HOSPITAL | End: 2020-07-04
Attending: FAMILY MEDICINE | Admitting: FAMILY MEDICINE
Payer: COMMERCIAL

## 2020-07-04 VITALS
BODY MASS INDEX: 23.24 KG/M2 | RESPIRATION RATE: 16 BRPM | SYSTOLIC BLOOD PRESSURE: 152 MMHG | TEMPERATURE: 97.8 F | WEIGHT: 162 LBS | OXYGEN SATURATION: 98 % | HEART RATE: 61 BPM | DIASTOLIC BLOOD PRESSURE: 79 MMHG

## 2020-07-04 DIAGNOSIS — I50.22 CHRONIC SYSTOLIC CONGESTIVE HEART FAILURE (H): ICD-10-CM

## 2020-07-04 DIAGNOSIS — M62.81 GENERALIZED MUSCLE WEAKNESS: ICD-10-CM

## 2020-07-04 DIAGNOSIS — Z95.0 PACEMAKER: ICD-10-CM

## 2020-07-04 DIAGNOSIS — R41.89 DECREASED LEVEL OF CONSCIOUSNESS: ICD-10-CM

## 2020-07-04 PROBLEM — R29.6 RECURRENT FALLS: Status: ACTIVE | Noted: 2018-05-19

## 2020-07-04 PROBLEM — I38 VALVULAR HEART DISEASE: Status: ACTIVE | Noted: 2018-05-19
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
## 2020-07-04 PROBLEM — I35.1 AORTIC INSUFFICIENCY: Status: ACTIVE | Noted: 2018-05-19
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
## 2020-07-04 PROBLEM — N28.89 LEFT RENAL MASS: Status: ACTIVE | Noted: 2018-05-19

## 2020-07-04 PROBLEM — R79.89 TROPONIN LEVEL ELEVATED: Status: ACTIVE | Noted: 2018-05-19

## 2020-07-04 LAB
ALBUMIN SERPL-MCNC: 4.1 G/DL (ref 3.5–5.7)
ALBUMIN UR-MCNC: 30 MG/DL
ALP SERPL-CCNC: 77 U/L (ref 34–104)
ALT SERPL W P-5'-P-CCNC: 15 U/L (ref 7–52)
ANION GAP SERPL CALCULATED.3IONS-SCNC: 8 MMOL/L (ref 3–14)
APPEARANCE UR: CLEAR
APTT PPP: 31 SEC (ref 22–37)
AST SERPL W P-5'-P-CCNC: 23 U/L (ref 13–39)
BASOPHILS # BLD AUTO: 0.1 10E9/L (ref 0–0.2)
BASOPHILS NFR BLD AUTO: 0.7 %
BILIRUB SERPL-MCNC: 1.1 MG/DL (ref 0.3–1)
BILIRUB UR QL STRIP: NEGATIVE
BUN SERPL-MCNC: 16 MG/DL (ref 7–25)
CALCIUM SERPL-MCNC: 9.5 MG/DL (ref 8.6–10.3)
CHLORIDE SERPL-SCNC: 103 MMOL/L (ref 98–107)
CO2 SERPL-SCNC: 24 MMOL/L (ref 21–31)
COLOR UR AUTO: ABNORMAL
CREAT SERPL-MCNC: 1.01 MG/DL (ref 0.7–1.3)
CRP SERPL-MCNC: 0.1 MG/L
DIFFERENTIAL METHOD BLD: ABNORMAL
EOSINOPHIL # BLD AUTO: 0.2 10E9/L (ref 0–0.7)
EOSINOPHIL NFR BLD AUTO: 3.1 %
ERYTHROCYTE [DISTWIDTH] IN BLOOD BY AUTOMATED COUNT: 13.3 % (ref 10–15)
GFR SERPL CREATININE-BSD FRML MDRD: 70 ML/MIN/{1.73_M2}
GLUCOSE SERPL-MCNC: 167 MG/DL (ref 70–105)
GLUCOSE UR STRIP-MCNC: NEGATIVE MG/DL
HCO3 BLDV-SCNC: 24 MMOL/L (ref 21–28)
HCT VFR BLD AUTO: 39.9 % (ref 40–53)
HGB BLD-MCNC: 13.2 G/DL (ref 13.3–17.7)
HGB UR QL STRIP: NEGATIVE
IMM GRANULOCYTES # BLD: 0 10E9/L (ref 0–0.4)
IMM GRANULOCYTES NFR BLD: 0.4 %
INR PPP: 1.08 (ref 0–1.3)
KETONES UR STRIP-MCNC: NEGATIVE MG/DL
LACTATE BLD-SCNC: 0.9 MMOL/L (ref 0.7–2)
LEUKOCYTE ESTERASE UR QL STRIP: NEGATIVE
LYMPHOCYTES # BLD AUTO: 1.7 10E9/L (ref 0.8–5.3)
LYMPHOCYTES NFR BLD AUTO: 23.1 %
MCH RBC QN AUTO: 29.7 PG (ref 26.5–33)
MCHC RBC AUTO-ENTMCNC: 33.1 G/DL (ref 31.5–36.5)
MCV RBC AUTO: 90 FL (ref 78–100)
MONOCYTES # BLD AUTO: 0.7 10E9/L (ref 0–1.3)
MONOCYTES NFR BLD AUTO: 9 %
MUCOUS THREADS #/AREA URNS LPF: PRESENT /LPF
NEUTROPHILS # BLD AUTO: 4.7 10E9/L (ref 1.6–8.3)
NEUTROPHILS NFR BLD AUTO: 63.7 %
NITRATE UR QL: NEGATIVE
O2/TOTAL GAS SETTING VFR VENT: 0 %
OXYHGB MFR BLDV: 96 %
PCO2 BLDV: 41 MM HG (ref 40–50)
PH BLDV: 7.39 PH (ref 7.32–7.43)
PH UR STRIP: 6 PH (ref 5–7)
PLATELET # BLD AUTO: 161 10E9/L (ref 150–450)
PO2 BLDV: 92 MM HG (ref 25–47)
POTASSIUM SERPL-SCNC: 4.1 MMOL/L (ref 3.5–5.1)
PROT SERPL-MCNC: 6.7 G/DL (ref 6.4–8.9)
RBC # BLD AUTO: 4.44 10E12/L (ref 4.4–5.9)
RBC #/AREA URNS AUTO: <1 /HPF (ref 0–2)
SODIUM SERPL-SCNC: 135 MMOL/L (ref 134–144)
SOURCE: ABNORMAL
SP GR UR STRIP: 1.03 (ref 1–1.03)
TROPONIN I SERPL-MCNC: 12.8 PG/ML
TROPONIN I SERPL-MCNC: 14.5 PG/ML
TSH SERPL DL<=0.05 MIU/L-ACNC: 1.07 IU/ML (ref 0.34–5.6)
UROBILINOGEN UR STRIP-MCNC: NORMAL MG/DL (ref 0–2)
WBC # BLD AUTO: 7.3 10E9/L (ref 4–11)
WBC #/AREA URNS AUTO: <1 /HPF (ref 0–5)

## 2020-07-04 PROCEDURE — 99285 EMERGENCY DEPT VISIT HI MDM: CPT | Mod: Z6 | Performed by: FAMILY MEDICINE

## 2020-07-04 PROCEDURE — 84443 ASSAY THYROID STIM HORMONE: CPT | Performed by: FAMILY MEDICINE

## 2020-07-04 PROCEDURE — 87040 BLOOD CULTURE FOR BACTERIA: CPT | Mod: 91 | Performed by: FAMILY MEDICINE

## 2020-07-04 PROCEDURE — 93005 ELECTROCARDIOGRAM TRACING: CPT | Performed by: FAMILY MEDICINE

## 2020-07-04 PROCEDURE — 70450 CT HEAD/BRAIN W/O DYE: CPT | Mod: XU

## 2020-07-04 PROCEDURE — 25500064 ZZH RX 255 OP 636: Performed by: FAMILY MEDICINE

## 2020-07-04 PROCEDURE — 36415 COLL VENOUS BLD VENIPUNCTURE: CPT | Performed by: FAMILY MEDICINE

## 2020-07-04 PROCEDURE — 93010 ELECTROCARDIOGRAM REPORT: CPT | Performed by: INTERNAL MEDICINE

## 2020-07-04 PROCEDURE — 87040 BLOOD CULTURE FOR BACTERIA: CPT | Performed by: FAMILY MEDICINE

## 2020-07-04 PROCEDURE — 36415 COLL VENOUS BLD VENIPUNCTURE: CPT | Mod: XU | Performed by: FAMILY MEDICINE

## 2020-07-04 PROCEDURE — 25800030 ZZH RX IP 258 OP 636: Performed by: FAMILY MEDICINE

## 2020-07-04 PROCEDURE — 85610 PROTHROMBIN TIME: CPT | Performed by: FAMILY MEDICINE

## 2020-07-04 PROCEDURE — 82805 BLOOD GASES W/O2 SATURATION: CPT | Performed by: FAMILY MEDICINE

## 2020-07-04 PROCEDURE — 80175 DRUG SCREEN QUAN LAMOTRIGINE: CPT | Performed by: FAMILY MEDICINE

## 2020-07-04 PROCEDURE — 84484 ASSAY OF TROPONIN QUANT: CPT | Performed by: FAMILY MEDICINE

## 2020-07-04 PROCEDURE — 84484 ASSAY OF TROPONIN QUANT: CPT | Mod: 91 | Performed by: FAMILY MEDICINE

## 2020-07-04 PROCEDURE — 85730 THROMBOPLASTIN TIME PARTIAL: CPT | Performed by: FAMILY MEDICINE

## 2020-07-04 PROCEDURE — 70498 CT ANGIOGRAPHY NECK: CPT

## 2020-07-04 PROCEDURE — 83605 ASSAY OF LACTIC ACID: CPT | Performed by: FAMILY MEDICINE

## 2020-07-04 PROCEDURE — 86140 C-REACTIVE PROTEIN: CPT | Performed by: FAMILY MEDICINE

## 2020-07-04 PROCEDURE — 81003 URINALYSIS AUTO W/O SCOPE: CPT | Performed by: FAMILY MEDICINE

## 2020-07-04 PROCEDURE — 85025 COMPLETE CBC W/AUTO DIFF WBC: CPT | Performed by: FAMILY MEDICINE

## 2020-07-04 PROCEDURE — 80053 COMPREHEN METABOLIC PANEL: CPT | Performed by: FAMILY MEDICINE

## 2020-07-04 PROCEDURE — 99285 EMERGENCY DEPT VISIT HI MDM: CPT | Mod: 25 | Performed by: FAMILY MEDICINE

## 2020-07-04 RX ORDER — IODIXANOL 320 MG/ML
100 INJECTION, SOLUTION INTRAVASCULAR ONCE
Status: COMPLETED | OUTPATIENT
Start: 2020-07-04 | End: 2020-07-04

## 2020-07-04 RX ORDER — CALCITONIN SALMON 200 [IU]/.09ML
1 SPRAY, METERED NASAL DAILY
COMMUNITY

## 2020-07-04 RX ORDER — FUROSEMIDE 20 MG
10 TABLET ORAL DAILY PRN
Status: ON HOLD | COMMUNITY
End: 2021-02-20

## 2020-07-04 RX ORDER — AMITRIPTYLINE HYDROCHLORIDE 10 MG/1
20 TABLET ORAL AT BEDTIME
Status: ON HOLD | COMMUNITY
End: 2021-08-03

## 2020-07-04 RX ADMIN — SODIUM CHLORIDE 500 ML: 9 INJECTION, SOLUTION INTRAVENOUS at 09:30

## 2020-07-04 RX ADMIN — IODIXANOL 100 ML: 320 INJECTION, SOLUTION INTRAVASCULAR at 09:27

## 2020-07-04 NOTE — ED NOTES
Pt more alert able to answer questions, slow to respond. Pt states he is here because he was shaking and was having trouble speaking. Pt denies seizure Hx. Pt is aware he is in Hospital. Pt states he is on lamictal for psychotic/ Pt states his right arm shakes when it is at rest. Pt has tremor in his right arm. Pt states he is also on Risperdal and took his pills this am. Reported to MD. Laureen Herrera RN .............. 7/4/2020  10:34 AM

## 2020-07-04 NOTE — ED NOTES
Bedside glucose 129. dysphagia screen not appropriate at this time. Laureen Herrera RN .............. 7/4/2020  9:20 AM

## 2020-07-04 NOTE — ED PROVIDER NOTES
"  History     Chief Complaint   Patient presents with     Cerebrovascular Accident     HPI  Edmaryuri Santana is a 85 year old male with hx of CHF and pacemaker in place who was his normal self this morning, up at 4:30 and using his walker, and sitting in his chair.  His wife was fixing him cereal and coffee at 07:30, and then wife states he couldn't get a strawberry on his spoon around 7:45AM and seemed discoordinated, then he became weak and couldn't speak well, and then he slumped forward over the kitchen table.  Wife called EMS and at that time he was able to weakly answer her but then he shook his arms and the occurred about three times and then he wasn't talking.  When EMS arrived he was slumped over on the kitchen table and \"looked dead\" with no response but found to be breathing.  He had unequal pupils with hx of glaucoma; he would blink when asked but otherwise flaccid and not moving/responding.  Stroke Team activation from EMS report and team assembled prior to patient arrival.  He is examined on EMS cart in [place] with acceptable vitals and proceeds directly to CT scanner.  His extremities are flaccid, his eyes are closed but with opening lids he will blink.     9:25 AM Back from CT scanner and he will weakly answer and follow commands but he is still weak all over.    9:38 AM I am able to interview his wife for additional history.    Allergies:  No Known Allergies    Problem List:    Patient Active Problem List    Diagnosis Date Noted     Aortic insufficiency 05/19/2018     Priority: Medium     Chronic systolic congestive heart failure (H) 05/19/2018     Priority: Medium     Fall at home, subsequent encounter 05/19/2018     Priority: Medium     Left renal mass 05/19/2018     Priority: Medium     Pacemaker 05/19/2018     Priority: Medium     Recurrent falls 05/19/2018     Priority: Medium     Troponin level elevated 05/19/2018     Priority: Medium     Valvular heart disease 05/19/2018     Priority: Medium     " Generalized muscle weakness 07/18/2014     Priority: Medium     Weakness 07/18/2014     Priority: Medium     Squamous cell carcinoma of skin of face 07/06/2012     Priority: Medium     Problem list name updated by automated process. Provider to review          Past Medical History:    No past medical history on file.    Past Surgical History:    No past surgical history on file.    Family History:    No family history on file.    Social History:  Marital Status:   [2]  Social History     Tobacco Use     Smoking status: Former Smoker     Smokeless tobacco: Never Used   Substance Use Topics     Alcohol use: No     Drug use: Unknown     Types: Other     Comment: Drug use: No        Medications:    amitriptyline (ELAVIL) 10 MG tablet  brimonidine (ALPHAGAN) 0.2 % ophthalmic solution  calcitonin, salmon, (MIACALCIN) 200 UNIT/ACT nasal spray  dorzolamide-timolol (COSOPT) 2-0.5 % ophthalmic solution  furosemide (LASIX) 20 MG tablet  lamoTRIgine (LAMICTAL) 200 MG tablet  levothyroxine (SSYNTHROID,LEVOTHROID) 100 MCG tablet  metoprolol (TOPROL-XL) 200 MG 24 hr tablet  risperiDONE (RISPERDAL) 4 MG tablet  simvastatin (ZOCOR) 40 MG tablet          Review of Systems   Unable to perform ROS: Mental status change   Musculoskeletal: Positive for gait problem (walks with a walker.).       Physical Exam   BP: 112/60  Pulse: 64  Heart Rate: 66  Temp: 97.8  F (36.6  C)  Resp: 16  Weight: 73.5 kg (162 lb)  SpO2: 92 %    EMS vitals: 154/75, 84, 97% sats.    Physical Exam  Vitals signs and nursing note reviewed.   Constitutional:       Appearance: He is normal weight. He is ill-appearing.      Comments: Initial exam patient is flaccid and poorly responsive. Eye opening to tactile stimulus.     GCS:   Motor 5=Localizes pain  Verbal 2=Incomprehensible speech  Eye Opening 2=To pain  Total: 9     HENT:      Head: Normocephalic and atraumatic.      Right Ear: Tympanic membrane, ear canal and external ear normal.      Left Ear: Tympanic  membrane, ear canal and external ear normal.      Nose: Nose normal.      Mouth/Throat:      Mouth: Mucous membranes are moist.      Pharynx: Oropharynx is clear.   Eyes:      General: No scleral icterus.     Conjunctiva/sclera: Conjunctivae normal.   Neck:      Comments: Stooped neck and chronic decreased ROM.  Cardiovascular:      Rate and Rhythm: Normal rate and regular rhythm.      Pulses: Normal pulses.      Heart sounds: No friction rub. No gallop.    Pulmonary:      Breath sounds: Normal breath sounds.   Abdominal:      General: Bowel sounds are normal. There is no distension.      Palpations: Abdomen is soft.      Tenderness: There is no abdominal tenderness.   Musculoskeletal:         General: Swelling (trace peripheral edema.) present.   Skin:     General: Skin is warm and dry.   Neurological:      Motor: Weakness (generalized.) present.      Comments: Flaccid whole body paresis on arrival with decreased LOC/ability to respond to stimuli.   Psychiatric:      Comments: Flat affect.         ED Course        Procedures          EKG: AV dual paced rhythm at 63 bpm.      Critical Care time:  none               Results for orders placed or performed during the hospital encounter of 07/04/20 (from the past 24 hour(s))   CT Head w/o Contrast    Narrative    PROCEDURE: CT HEAD W/O CONTRAST     HISTORY: Altered level of consciousness (LOC), unexplained; Last known  well at 07:30.    COMPARISON: None.    TECHNIQUE:  Helical images of the head from the foramen magnum to the  vertex were obtained without contrast.    FINDINGS: The ventricles and sulci are normal in volume. No acute  intracranial hemorrhage, mass effect, midline shift, hydrocephalus or  basilar cystern effacement are present.    The grey-white matter interface is preserved. There is white matter  low density in both hemispheres consistent with small vessel changes    The calvarium is intact. The mastoid air cells are clear.  The  visualized paranasal  sinuses are clear.      Impression    IMPRESSION: No acute brain abnormality.      HETAL BLACKMON MD   CBC with platelets differential   Result Value Ref Range    WBC 7.3 4.0 - 11.0 10e9/L    RBC Count 4.44 4.4 - 5.9 10e12/L    Hemoglobin 13.2 (L) 13.3 - 17.7 g/dL    Hematocrit 39.9 (L) 40.0 - 53.0 %    MCV 90 78 - 100 fl    MCH 29.7 26.5 - 33.0 pg    MCHC 33.1 31.5 - 36.5 g/dL    RDW 13.3 10.0 - 15.0 %    Platelet Count 161 150 - 450 10e9/L    Diff Method Automated Method     % Neutrophils 63.7 %    % Lymphocytes 23.1 %    % Monocytes 9.0 %    % Eosinophils 3.1 %    % Basophils 0.7 %    % Immature Granulocytes 0.4 %    Absolute Neutrophil 4.7 1.6 - 8.3 10e9/L    Absolute Lymphocytes 1.7 0.8 - 5.3 10e9/L    Absolute Monocytes 0.7 0.0 - 1.3 10e9/L    Absolute Eosinophils 0.2 0.0 - 0.7 10e9/L    Absolute Basophils 0.1 0.0 - 0.2 10e9/L    Abs Immature Granulocytes 0.0 0 - 0.4 10e9/L   INR   Result Value Ref Range    INR 1.08 0 - 1.3   Partial thromboplastin time   Result Value Ref Range    PTT 31 22 - 37 sec   Troponin GH   Result Value Ref Range    Troponin 12.8 <34.0 pg/mL   Comprehensive metabolic panel   Result Value Ref Range    Sodium 135 134 - 144 mmol/L    Potassium 4.1 3.5 - 5.1 mmol/L    Chloride 103 98 - 107 mmol/L    Carbon Dioxide 24 21 - 31 mmol/L    Anion Gap 8 3 - 14 mmol/L    Glucose 167 (H) 70 - 105 mg/dL    Urea Nitrogen 16 7 - 25 mg/dL    Creatinine 1.01 0.70 - 1.30 mg/dL    GFR Estimate 70 >60 mL/min/[1.73_m2]    GFR Estimate If Black 85 >60 mL/min/[1.73_m2]    Calcium 9.5 8.6 - 10.3 mg/dL    Bilirubin Total 1.1 (H) 0.3 - 1.0 mg/dL    Albumin 4.1 3.5 - 5.7 g/dL    Protein Total 6.7 6.4 - 8.9 g/dL    Alkaline Phosphatase 77 34 - 104 U/L    ALT 15 7 - 52 U/L    AST 23 13 - 39 U/L   Lactic acid whole blood   Result Value Ref Range    Lactic Acid 0.9 0.7 - 2.0 mmol/L   CRP inflammation   Result Value Ref Range    CRP Inflammation 0.1 <0.5 mg/L   Blood gas venous and oxyhgb   Result Value Ref Range     Ph Venous 7.39 7.32 - 7.43 pH    PCO2 Venous 41 40 - 50 mm Hg    PO2 Venous 92 (H) 25 - 47 mm Hg    Bicarbonate Venous 24 21 - 28 mmol/L    FIO2 0     Oxyhemoglobin Venous 96 %   TSH Reflex GH   Result Value Ref Range    TSH Reflex 1.07 0.34 - 5.60 IU/mL   CTA Head Neck with Contrast    Narrative    PROCEDURE: CTA  HEAD NECK WITH CONTRAST 7/4/2020 9:26 AM    HISTORY: Ataxia, stroke suspected; ; Last known well 07:30.  Evaluate  for dissection/thromboembolism    COMPARISONS: None.    Meds/Dose Given: 100 ml visipaque 320    TECHNIQUE: CT angiogram of the brain with sagittal coronal MIPS  reconstructions    FINDINGS: CT angiogram of the neck: Brachiocephalic artery is widely  patent. The right vertebral artery is widely patent to the skull base.  There is atherosclerotic plaquing at the carotid bifurcation on the  right without stenosis. The left common carotid artery is tortuous.  There is atherosclerotic plaquing at the left carotid bifurcation  without stenosis. The left internal carotid artery beyond the  bifurcation is widely patent skull base. There is atherosclerotic  plaquing in the left subclavian artery. The left vertebral artery is  widely patent to the skull base.    CT angiogram of the brain: The distal vertebral basilar superior  cerebellar and posterior cerebral arteries are normal. Carotid siphons  are widely patent. The supraclinoid internal carotid arteries are  normal. Both anterior cerebral arteries are normal. There is a 4 mm in  diameter aneurysm at the left middle cerebral artery trifurcation.    The salivary glands are normal. The muscles of mastication and the  parapharyngeal spaces are normal. The larynx and upper trachea is  normal. There is a right thyroid nodule measuring 2.7 cm with  peripheral calcifications. There is a 4 mm diameter low-density nodule  in the left lobe of thyroid. The cervical and upper mediastinal lymph  nodes are normal in caliber. The lung apices are clear.  Degenerative  changes are present in the cervical spine.         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    No hemodynamically significant stenoses or vascular occlusions are  noted.    HETAL BLACKMON MD   Troponin GH (now)   Result Value Ref Range    Troponin 14.5 <34.0 pg/mL       Medications   0.9% sodium chloride BOLUS (0 mLs Intravenous Stopped 7/4/20 1015)   iodixanol (VISIPAQUE 320) injection 100 mL (100 mLs Intravenous Given 7/4/20 0995)     10:21 AM discussed with Dr. Moe Shields, [facility] Stroke Neurologist and feels that patient likely has post-syncope tremors and feels seizure less likely than cardiac/pacer cause of syncope.  Recommending admission for cardiac eval and pacer check.  Also, patient appears to be on psych medication for ?bipolar/PTSD/MDD and check for any recent change in medications.  Pharmacy has been trying to get medication reconciliation from the [facility].  Will check Lamictal level as we have that medication listed.  4mm incidental aneurysm finding.    10:38 AM I started initial discussion with [facility] regarding possible transfer versus admission here.  I have checked patient in room and he is better, answering some questions but weak all over.  He did not pass his bedside swallow test with coughing/sputtering.  He and his wife would like to be transferred to the [facility] if possible.    10:41 AM [facility] is paging out the MOD and will call back.    10:53 AM I discussed with MOD and wanting to make sure he is stable for a 4 hours transfer and will check 90 minute troponin and continue with q30 minute neuro checks and reassess stability for transfer then.  Will Call back Nursing Sachin Aviles at [phone].    12:17 PM patient has much more fluent speech near/at baseline.  Still weak but feeling better.  He has a stable negative serial troponin and no ectopy on cardiac monitor.  I have placed a call to ZANDRA Pulido at [facility] to discuss transfer.       12:30 PM I have updated ZANDRA Pulido at [facility] [place] accepting patient in ground EMS transfer.    [facility]x Covid19 ROS.     Assessments & Plan (with Medical Decision Making)   85 year old male with pacemaker and hx of CHF had an unusual episode this morning with progressive whole body weakness and syncope versus seizure.  He reports he could hear the EMS crew but couldn't move or speak/respond.  Slow steady improvement in the ED with negative initial Stroke work up and review by [facility] Stroke Neurologist.  [facility] patient to transfer and continue evaluation.    I have reviewed the nursing notes.    I have reviewed the findings, diagnosis, plan and need for follow up with the patient.       New Prescriptions    No medications on file       Final diagnoses:   Decreased level of consciousness - transient decreased LOC and improving without focal neuro deficits.   Pacemaker   Chronic systolic congestive heart failure (H)   Generalized muscle weakness       7/4/2020   [facility]     Pedro Horowitz MD  07/04/20 1648       Pedro Horowitz MD  07/04/20 123

## 2020-07-04 NOTE — PHARMACY-ADMISSION MEDICATION HISTORY
Pharmacy -- Admission Medication Reconciliation    Prior to admission (PTA) medications were reviewed and the patient's PTA medication list was updated.    Sources Consulted: [facility] (nothing), [facility] (nothing for 6 years), [facility], [facility]    The reliability of this Medication Reconciliation is: Reliability: Borderline reliable    The following significant changes were made:  Removed alprazolam  Removed amitriptyline 50 mg  Removed amitriptyline 50 mg  Removed amitriptyline 75 mg  Added amitriptyline 20 mg  Removed aripiprazole 5 mg  Removed aripiprazole 10mg  Removed aripiprazole 1/2 of 10 mg  Removed [facility] notes  Removed gentamicin  Removed lamotrigine 200 mg daily  Removed duplicate levothyroxine 100 mcg  Removed levothyroxine 88 mcg  Removed duplicate metoprolol succinate 100 mg  Removed metoprolol succinate 200 mg  Removed risperidone 2 mg  Removed risperidone 4 mg  Removed duplicate simvastatin 20 mg  Removed simvastatin 40 mg  Removed sulfamethoxazole/trimethoprim  Added calcitonin  Added furosemide      In addition, the patient's allergies were reviewed with the patient's records  and left as follows:   Allergies: Patient has no known allergies.     Medication barriers identified: not assessed   Medication adherence concerns: not assessed   Understanding of emergency medications: n/a    Tiffanie Lugo [facility], 7/4/2020,  11:27 AM

## 2020-07-04 NOTE — ED TRIAGE NOTES
Patient presents to the ED via EMS with complaints of possible stroke.  Per EMS report last known well was 0730 this morning.  Call was placed out due to patient being slumped over the kitchen table, however, at that time patient was responsive to wife.  On EMS arrival patient was slumped over and non verbal when questioned, however, patient will blink respond when directed.  EMS noted unequal pupils, however, states patient has a hx of glaucoma to the L) eye.  Wife also noted increased weakness over the past few days.  18G PIV established by EMS prior to arrival.  No facial droop present at this time.  Patient wife denies known covid exposure or any recent travel.  Patient immediately taken to CT on arrival.

## 2020-07-04 NOTE — ED NOTES
MD quick assessed pt upon arrival, straight to CT. Laureen Herrera RN .............. 7/4/2020  9:20 AM

## 2020-07-04 NOTE — ED NOTES
Pt states he took his meds this am. Pt states he takes his meds Am and Pm but hasn't had his dose changed or levels checked in about 2 years. Pt failed Dysphagia screen about a min. After. Pt states he normally does not have trouble swallowing. Pt states his amitriptaline was decreased due to age though. Laureen Herrera RN .............. 7/4/2020  10:42 AM

## 2020-07-06 LAB
INTERPRETATION ECG - MUSE: NORMAL
LAMOTRIGINE SERPL-MCNC: 19 UG/ML (ref 2.5–15)

## 2021-02-19 ENCOUNTER — APPOINTMENT (OUTPATIENT)
Dept: CT IMAGING | Facility: OTHER | Age: 86
End: 2021-02-19
Attending: PHYSICIAN ASSISTANT
Payer: COMMERCIAL

## 2021-02-19 ENCOUNTER — ANCILLARY PROCEDURE (OUTPATIENT)
Dept: CARDIOLOGY | Facility: CLINIC | Age: 86
End: 2021-02-19
Attending: PHYSICIAN ASSISTANT
Payer: MEDICARE

## 2021-02-19 ENCOUNTER — APPOINTMENT (OUTPATIENT)
Dept: GENERAL RADIOLOGY | Facility: OTHER | Age: 86
End: 2021-02-19
Attending: PHYSICIAN ASSISTANT
Payer: COMMERCIAL

## 2021-02-19 ENCOUNTER — HOSPITAL ENCOUNTER (OUTPATIENT)
Facility: OTHER | Age: 86
Setting detail: OBSERVATION
Discharge: HOME | End: 2021-02-20
Attending: PHYSICIAN ASSISTANT | Admitting: FAMILY MEDICINE
Payer: COMMERCIAL

## 2021-02-19 DIAGNOSIS — I50.22 CHRONIC SYSTOLIC CONGESTIVE HEART FAILURE (H): ICD-10-CM

## 2021-02-19 DIAGNOSIS — Z87.891 PERSONAL HISTORY OF TOBACCO USE, PRESENTING HAZARDS TO HEALTH: ICD-10-CM

## 2021-02-19 DIAGNOSIS — I50.22 CHRONIC SYSTOLIC CONGESTIVE HEART FAILURE (H): Primary | ICD-10-CM

## 2021-02-19 DIAGNOSIS — I50.9 CHF EXACERBATION (H): ICD-10-CM

## 2021-02-19 DIAGNOSIS — E03.9 HYPOTHYROIDISM, UNSPECIFIED TYPE: ICD-10-CM

## 2021-02-19 DIAGNOSIS — I50.9 ACUTE ON CHRONIC CONGESTIVE HEART FAILURE, UNSPECIFIED HEART FAILURE TYPE (H): ICD-10-CM

## 2021-02-19 DIAGNOSIS — Z11.52 ENCOUNTER FOR SCREENING LABORATORY TESTING FOR COVID-19 VIRUS: ICD-10-CM

## 2021-02-19 DIAGNOSIS — I50.23 ACUTE ON CHRONIC SYSTOLIC CONGESTIVE HEART FAILURE (H): Primary | ICD-10-CM

## 2021-02-19 PROBLEM — R06.09 DYSPNEA ON EXERTION: Status: ACTIVE | Noted: 2021-02-19

## 2021-02-19 PROBLEM — F32.A DEPRESSION: Status: ACTIVE | Noted: 2021-02-19

## 2021-02-19 PROBLEM — Z95.0 CARDIAC PACEMAKER IN SITU: Status: ACTIVE | Noted: 2018-05-19

## 2021-02-19 LAB
ALBUMIN SERPL-MCNC: 4 G/DL (ref 3.5–5.7)
ALBUMIN UR-MCNC: NEGATIVE MG/DL
ALP SERPL-CCNC: 68 U/L (ref 34–104)
ALT SERPL W P-5'-P-CCNC: 10 U/L (ref 7–52)
ANION GAP SERPL CALCULATED.3IONS-SCNC: 6 MMOL/L (ref 3–14)
APPEARANCE UR: CLEAR
APTT PPP: 37 SEC (ref 22–37)
AST SERPL W P-5'-P-CCNC: 16 U/L (ref 13–39)
BASOPHILS # BLD AUTO: 0 10E9/L (ref 0–0.2)
BASOPHILS NFR BLD AUTO: 0.6 %
BILIRUB SERPL-MCNC: 0.9 MG/DL (ref 0.3–1)
BILIRUB UR QL STRIP: NEGATIVE
BUN SERPL-MCNC: 19 MG/DL (ref 7–25)
CALCIUM SERPL-MCNC: 9.4 MG/DL (ref 8.6–10.3)
CHLORIDE SERPL-SCNC: 103 MMOL/L (ref 98–107)
CO2 SERPL-SCNC: 27 MMOL/L (ref 21–31)
COLOR UR AUTO: NORMAL
CREAT SERPL-MCNC: 1.26 MG/DL (ref 0.7–1.3)
DIFFERENTIAL METHOD BLD: ABNORMAL
EOSINOPHIL # BLD AUTO: 0.1 10E9/L (ref 0–0.7)
EOSINOPHIL NFR BLD AUTO: 1.5 %
ERYTHROCYTE [DISTWIDTH] IN BLOOD BY AUTOMATED COUNT: 14.2 % (ref 10–15)
FLUAV RNA RESP QL NAA+PROBE: NEGATIVE
FLUBV RNA RESP QL NAA+PROBE: NEGATIVE
GFR SERPL CREATININE-BSD FRML MDRD: 54 ML/MIN/{1.73_M2}
GLUCOSE SERPL-MCNC: 174 MG/DL (ref 70–105)
GLUCOSE UR STRIP-MCNC: NEGATIVE MG/DL
HCT VFR BLD AUTO: 37.3 % (ref 40–53)
HGB BLD-MCNC: 12.3 G/DL (ref 13.3–17.7)
HGB UR QL STRIP: NEGATIVE
IMM GRANULOCYTES # BLD: 0 10E9/L (ref 0–0.4)
IMM GRANULOCYTES NFR BLD: 0.2 %
INR PPP: 1.06 (ref 0.86–1.14)
KETONES UR STRIP-MCNC: NEGATIVE MG/DL
LABORATORY COMMENT REPORT: NORMAL
LACTATE BLD-SCNC: 1.3 MMOL/L (ref 0.7–2)
LEUKOCYTE ESTERASE UR QL STRIP: NEGATIVE
LIPASE SERPL-CCNC: 17 U/L (ref 11–82)
LYMPHOCYTES # BLD AUTO: 1.3 10E9/L (ref 0.8–5.3)
LYMPHOCYTES NFR BLD AUTO: 23.9 %
MCH RBC QN AUTO: 29.3 PG (ref 26.5–33)
MCHC RBC AUTO-ENTMCNC: 33 G/DL (ref 31.5–36.5)
MCV RBC AUTO: 89 FL (ref 78–100)
MONOCYTES # BLD AUTO: 0.6 10E9/L (ref 0–1.3)
MONOCYTES NFR BLD AUTO: 10.9 %
NEUTROPHILS # BLD AUTO: 3.3 10E9/L (ref 1.6–8.3)
NEUTROPHILS NFR BLD AUTO: 62.9 %
NITRATE UR QL: NEGATIVE
NT-PROBNP SERPL-MCNC: 787 PG/ML (ref 0–100)
PH UR STRIP: 5.5 PH (ref 5–7)
PLATELET # BLD AUTO: 148 10E9/L (ref 150–450)
POTASSIUM SERPL-SCNC: 3.6 MMOL/L (ref 3.5–5.1)
PROCALCITONIN SERPL-MCNC: <0.5 NG/ML
PROT SERPL-MCNC: 7.1 G/DL (ref 6.4–8.9)
RBC # BLD AUTO: 4.2 10E12/L (ref 4.4–5.9)
RSV RNA SPEC QL NAA+PROBE: NEGATIVE
SARS-COV-2 RNA RESP QL NAA+PROBE: NEGATIVE
SODIUM SERPL-SCNC: 136 MMOL/L (ref 134–144)
SOURCE: NORMAL
SP GR UR STRIP: 1.02 (ref 1–1.03)
SPECIMEN SOURCE: NORMAL
TROPONIN I SERPL-MCNC: 16.2 PG/ML
UROBILINOGEN UR STRIP-MCNC: NORMAL MG/DL (ref 0–2)
WBC # BLD AUTO: 5.2 10E9/L (ref 4–11)

## 2021-02-19 PROCEDURE — 87636 SARSCOV2 & INF A&B AMP PRB: CPT | Performed by: PHYSICIAN ASSISTANT

## 2021-02-19 PROCEDURE — 80053 COMPREHEN METABOLIC PANEL: CPT | Performed by: PHYSICIAN ASSISTANT

## 2021-02-19 PROCEDURE — 250N000013 HC RX MED GY IP 250 OP 250 PS 637: Performed by: FAMILY MEDICINE

## 2021-02-19 PROCEDURE — 83605 ASSAY OF LACTIC ACID: CPT | Performed by: PHYSICIAN ASSISTANT

## 2021-02-19 PROCEDURE — 83880 ASSAY OF NATRIURETIC PEPTIDE: CPT | Performed by: PHYSICIAN ASSISTANT

## 2021-02-19 PROCEDURE — 83690 ASSAY OF LIPASE: CPT | Performed by: PHYSICIAN ASSISTANT

## 2021-02-19 PROCEDURE — 93010 ELECTROCARDIOGRAM REPORT: CPT | Performed by: INTERNAL MEDICINE

## 2021-02-19 PROCEDURE — 71275 CT ANGIOGRAPHY CHEST: CPT

## 2021-02-19 PROCEDURE — 99285 EMERGENCY DEPT VISIT HI MDM: CPT | Performed by: PHYSICIAN ASSISTANT

## 2021-02-19 PROCEDURE — 93295 DEV INTERROG REMOTE 1/2/MLT: CPT | Performed by: INTERNAL MEDICINE

## 2021-02-19 PROCEDURE — 93005 ELECTROCARDIOGRAM TRACING: CPT | Performed by: PHYSICIAN ASSISTANT

## 2021-02-19 PROCEDURE — 81003 URINALYSIS AUTO W/O SCOPE: CPT | Performed by: PHYSICIAN ASSISTANT

## 2021-02-19 PROCEDURE — 99285 EMERGENCY DEPT VISIT HI MDM: CPT | Mod: 25 | Performed by: PHYSICIAN ASSISTANT

## 2021-02-19 PROCEDURE — C9803 HOPD COVID-19 SPEC COLLECT: HCPCS | Performed by: PHYSICIAN ASSISTANT

## 2021-02-19 PROCEDURE — 93005 ELECTROCARDIOGRAM TRACING: CPT | Mod: 76 | Performed by: PHYSICIAN ASSISTANT

## 2021-02-19 PROCEDURE — 85025 COMPLETE CBC W/AUTO DIFF WBC: CPT | Performed by: PHYSICIAN ASSISTANT

## 2021-02-19 PROCEDURE — 255N000002 HC RX 255 OP 636: Performed by: PHYSICIAN ASSISTANT

## 2021-02-19 PROCEDURE — 99219 PR INITIAL OBSERVATION CARE,LEVEL II: CPT | Performed by: FAMILY MEDICINE

## 2021-02-19 PROCEDURE — 250N000011 HC RX IP 250 OP 636: Performed by: PHYSICIAN ASSISTANT

## 2021-02-19 PROCEDURE — 71045 X-RAY EXAM CHEST 1 VIEW: CPT

## 2021-02-19 PROCEDURE — 84145 PROCALCITONIN (PCT): CPT | Performed by: PHYSICIAN ASSISTANT

## 2021-02-19 PROCEDURE — G0378 HOSPITAL OBSERVATION PER HR: HCPCS

## 2021-02-19 PROCEDURE — 96374 THER/PROPH/DIAG INJ IV PUSH: CPT | Mod: XU | Performed by: PHYSICIAN ASSISTANT

## 2021-02-19 PROCEDURE — 93296 REM INTERROG EVL PM/IDS: CPT

## 2021-02-19 PROCEDURE — 36415 COLL VENOUS BLD VENIPUNCTURE: CPT | Performed by: PHYSICIAN ASSISTANT

## 2021-02-19 PROCEDURE — 85730 THROMBOPLASTIN TIME PARTIAL: CPT | Performed by: PHYSICIAN ASSISTANT

## 2021-02-19 PROCEDURE — 85610 PROTHROMBIN TIME: CPT | Performed by: PHYSICIAN ASSISTANT

## 2021-02-19 PROCEDURE — 84484 ASSAY OF TROPONIN QUANT: CPT | Performed by: PHYSICIAN ASSISTANT

## 2021-02-19 RX ORDER — FUROSEMIDE 10 MG/ML
10 INJECTION INTRAMUSCULAR; INTRAVENOUS ONCE
Status: COMPLETED | OUTPATIENT
Start: 2021-02-19 | End: 2021-02-19

## 2021-02-19 RX ORDER — LIDOCAINE 40 MG/G
CREAM TOPICAL
Status: DISCONTINUED | OUTPATIENT
Start: 2021-02-19 | End: 2021-02-20 | Stop reason: HOSPADM

## 2021-02-19 RX ORDER — LEVOTHYROXINE SODIUM 100 UG/1
100 TABLET ORAL DAILY
Status: DISCONTINUED | OUTPATIENT
Start: 2021-02-20 | End: 2021-02-20 | Stop reason: HOSPADM

## 2021-02-19 RX ORDER — ACETAMINOPHEN 325 MG/1
650 TABLET ORAL EVERY 4 HOURS PRN
Status: DISCONTINUED | OUTPATIENT
Start: 2021-02-19 | End: 2021-02-20 | Stop reason: HOSPADM

## 2021-02-19 RX ORDER — METOPROLOL SUCCINATE 100 MG/1
100 TABLET, EXTENDED RELEASE ORAL AT BEDTIME
Status: DISCONTINUED | OUTPATIENT
Start: 2021-02-19 | End: 2021-02-20 | Stop reason: HOSPADM

## 2021-02-19 RX ORDER — IODIXANOL 320 MG/ML
100 INJECTION, SOLUTION INTRAVASCULAR ONCE
Status: COMPLETED | OUTPATIENT
Start: 2021-02-19 | End: 2021-02-19

## 2021-02-19 RX ORDER — FUROSEMIDE 20 MG
20 TABLET ORAL DAILY
Status: DISCONTINUED | OUTPATIENT
Start: 2021-02-20 | End: 2021-02-20 | Stop reason: HOSPADM

## 2021-02-19 RX ORDER — LAMOTRIGINE 100 MG/1
200 TABLET ORAL 2 TIMES DAILY
Status: DISCONTINUED | OUTPATIENT
Start: 2021-02-19 | End: 2021-02-20 | Stop reason: HOSPADM

## 2021-02-19 RX ORDER — ATORVASTATIN CALCIUM 10 MG/1
10 TABLET, FILM COATED ORAL AT BEDTIME
Status: DISCONTINUED | OUTPATIENT
Start: 2021-02-19 | End: 2021-02-20 | Stop reason: HOSPADM

## 2021-02-19 RX ORDER — RISPERIDONE 1 MG/1
2 TABLET ORAL AT BEDTIME
Status: DISCONTINUED | OUTPATIENT
Start: 2021-02-19 | End: 2021-02-20 | Stop reason: HOSPADM

## 2021-02-19 RX ADMIN — METOPROLOL SUCCINATE 100 MG: 100 TABLET, EXTENDED RELEASE ORAL at 21:42

## 2021-02-19 RX ADMIN — FUROSEMIDE 10 MG: 10 INJECTION, SOLUTION INTRAMUSCULAR; INTRAVENOUS at 19:54

## 2021-02-19 RX ADMIN — RISPERIDONE 2 MG: 1 TABLET ORAL at 21:42

## 2021-02-19 RX ADMIN — ATORVASTATIN CALCIUM 10 MG: 10 TABLET, FILM COATED ORAL at 21:42

## 2021-02-19 RX ADMIN — LAMOTRIGINE 200 MG: 100 TABLET ORAL at 21:42

## 2021-02-19 RX ADMIN — IODIXANOL 100 ML: 320 INJECTION, SOLUTION INTRAVASCULAR at 17:15

## 2021-02-19 ASSESSMENT — MIFFLIN-ST. JEOR
SCORE: 1360.76
SCORE: 1426.25

## 2021-02-19 ASSESSMENT — ENCOUNTER SYMPTOMS
CHEST TIGHTNESS: 0
HEMATURIA: 0
SHORTNESS OF BREATH: 1
CHILLS: 0
ABDOMINAL PAIN: 0
FEVER: 0
ADENOPATHY: 0
CONFUSION: 0
WOUND: 0
BACK PAIN: 0
BRUISES/BLEEDS EASILY: 0
COUGH: 1

## 2021-02-19 NOTE — ED TRIAGE NOTES
"Pt comes in with increased SOB x 2 weeks. Hx of heart failure, weakness, wife states he has not been sleeing at night and she hears a \"squeaking\"   "

## 2021-02-19 NOTE — ED PROVIDER NOTES
History     Chief Complaint   Patient presents with     Shortness of Breath     Generalized Weakness     Wheezing     at night     HPI  Tashi Santana is a 86 year old male who presents to the ED for evaluation of increased shortness of breath and weakness over the last couple of weeks.  He lives at home with his wife and says that he is usually up using a walker/cane and that this becomes very difficult for him recently as he becomes very dyspneic with any exertion.  He denies any chest pain, abd pain, dysuria, dark stools. Hx CHF, has pacemaker. He does report a slight cough.    Allergies:  No Known Allergies    Problem List:    Patient Active Problem List    Diagnosis Date Noted     Acute on chronic systolic congestive heart failure (H) 02/19/2021     Priority: Medium     Depression 02/19/2021     Priority: Medium     Acquired hypothyroidism 02/19/2021     Priority: Medium     Dyspnea on exertion 02/19/2021     Priority: Medium     CHF exacerbation (H) 02/19/2021     Priority: Medium     Aortic insufficiency 05/19/2018     Priority: Medium     Chronic systolic congestive heart failure (H) 05/19/2018     Priority: Medium     Fall at home, subsequent encounter 05/19/2018     Priority: Medium     Left renal mass 05/19/2018     Priority: Medium     Cardiac pacemaker in situ 05/19/2018     Priority: Medium     Recurrent falls 05/19/2018     Priority: Medium     Troponin level elevated 05/19/2018     Priority: Medium     Valvular heart disease 05/19/2018     Priority: Medium     Generalized muscle weakness 07/18/2014     Priority: Medium     Weakness 07/18/2014     Priority: Medium     Squamous cell carcinoma of skin of face 07/06/2012     Priority: Medium     Problem list name updated by automated process. Provider to review          Past Medical History:    Past Medical History:   Diagnosis Date     Acquired hypothyroidism      Depression      Systolic congestive heart failure (H) 2012       Past Surgical History:   "  Past Surgical History:   Procedure Laterality Date     CA ANESTH,PACEMAKER INSERTION         Family History:    Family History   Problem Relation Age of Onset     Lung Cancer Mother      Alzheimer Disease Father        Social History:  Marital Status:   [2]  Social History     Tobacco Use     Smoking status: Former Smoker     Smokeless tobacco: Never Used   Substance Use Topics     Alcohol use: No     Drug use: Unknown     Types: Other     Comment: Drug use: No        Medications:    No current outpatient medications on file.        Review of Systems   Constitutional: Negative for chills and fever.   HENT: Negative for congestion.    Eyes: Negative for visual disturbance.   Respiratory: Positive for cough and shortness of breath. Negative for chest tightness.    Cardiovascular: Negative for chest pain.   Gastrointestinal: Negative for abdominal pain.   Genitourinary: Negative for hematuria.   Musculoskeletal: Negative for back pain.   Skin: Negative for rash and wound.   Neurological: Negative for syncope.   Hematological: Negative for adenopathy. Does not bruise/bleed easily.   Psychiatric/Behavioral: Negative for confusion.       Physical Exam   BP: (!) 140/81  Pulse: 82  Temp: 96  F (35.6  C)  Resp: 24  Height: 177.8 cm (5' 10\")  Weight: 73.5 kg (162 lb 0.6 oz)  SpO2: 96 %      Physical Exam  Constitutional:       General: He is not in acute distress.     Appearance: He is not diaphoretic.      Comments: Cachectic appearing   HENT:      Head: Normocephalic and atraumatic.   Eyes:      General: No scleral icterus.     Conjunctiva/sclera: Conjunctivae normal.   Neck:      Musculoskeletal: Neck supple.   Cardiovascular:      Rate and Rhythm: Normal rate and regular rhythm.   Pulmonary:      Effort: Pulmonary effort is normal.      Breath sounds: Examination of the right-middle field reveals rhonchi. Rhonchi present.   Abdominal:      Palpations: Abdomen is soft.      Tenderness: There is no abdominal " tenderness.   Musculoskeletal:         General: No deformity.   Lymphadenopathy:      Cervical: No cervical adenopathy.   Skin:     General: Skin is warm and dry.      Findings: No rash.   Neurological:      Mental Status: He is alert and oriented to person, place, and time. Mental status is at baseline.   Psychiatric:         Mood and Affect: Mood normal.         Behavior: Behavior normal.         ED Course        Procedures          EKG read at 1441, HR 85, atrial sensed ventricular-paced rhythm with occasional PVC.     EKG read at 1452, HR 79, posterior EKG, atrial sensed ventricular-paced rhythm, occasional PVC.     Critical Care time:  none               Results for orders placed or performed during the hospital encounter of 02/19/21 (from the past 24 hour(s))   CBC with platelets differential   Result Value Ref Range    WBC 5.2 4.0 - 11.0 10e9/L    RBC Count 4.20 (L) 4.4 - 5.9 10e12/L    Hemoglobin 12.3 (L) 13.3 - 17.7 g/dL    Hematocrit 37.3 (L) 40.0 - 53.0 %    MCV 89 78 - 100 fl    MCH 29.3 26.5 - 33.0 pg    MCHC 33.0 31.5 - 36.5 g/dL    RDW 14.2 10.0 - 15.0 %    Platelet Count 148 (L) 150 - 450 10e9/L    Diff Method Automated Method     % Neutrophils 62.9 %    % Lymphocytes 23.9 %    % Monocytes 10.9 %    % Eosinophils 1.5 %    % Basophils 0.6 %    % Immature Granulocytes 0.2 %    Absolute Neutrophil 3.3 1.6 - 8.3 10e9/L    Absolute Lymphocytes 1.3 0.8 - 5.3 10e9/L    Absolute Monocytes 0.6 0.0 - 1.3 10e9/L    Absolute Eosinophils 0.1 0.0 - 0.7 10e9/L    Absolute Basophils 0.0 0.0 - 0.2 10e9/L    Abs Immature Granulocytes 0.0 0 - 0.4 10e9/L   INR   Result Value Ref Range    INR 1.06 0.86 - 1.14   Partial thromboplastin time   Result Value Ref Range    PTT 37 22 - 37 sec   Comprehensive metabolic panel   Result Value Ref Range    Sodium 136 134 - 144 mmol/L    Potassium 3.6 3.5 - 5.1 mmol/L    Chloride 103 98 - 107 mmol/L    Carbon Dioxide 27 21 - 31 mmol/L    Anion Gap 6 3 - 14 mmol/L    Glucose 174 (H) 70  - 105 mg/dL    Urea Nitrogen 19 7 - 25 mg/dL    Creatinine 1.26 0.70 - 1.30 mg/dL    GFR Estimate 54 (L) >60 mL/min/[1.73_m2]    GFR Estimate If Black 66 >60 mL/min/[1.73_m2]    Calcium 9.4 8.6 - 10.3 mg/dL    Bilirubin Total 0.9 0.3 - 1.0 mg/dL    Albumin 4.0 3.5 - 5.7 g/dL    Protein Total 7.1 6.4 - 8.9 g/dL    Alkaline Phosphatase 68 34 - 104 U/L    ALT 10 7 - 52 U/L    AST 16 13 - 39 U/L   Lipase   Result Value Ref Range    Lipase 17 11 - 82 U/L   Lactic acid whole blood   Result Value Ref Range    Lactic Acid 1.3 0.7 - 2.0 mmol/L   Troponin GH   Result Value Ref Range    Troponin 16.2 <34.0 pg/mL   Nt probnp inpatient (BNP)   Result Value Ref Range    N-Terminal Pro BNP Inpatient 787 (H) 0 - 100 pg/mL   XR Chest Port 1 View    Narrative    PROCEDURE:  XR CHEST PORT 1 VW    HISTORY:  sob, hx chf.     COMPARISON:  2018    FINDINGS:   The heart is enlarged. There is a transvenous pacemaker. There is some  interstitial thickening seen in both lungs. There are some confluent  areas of increased density seen just above the minor fissure in the  right upper lobe suspicious for pneumonia. No pleural effusion or  pneumothorax.      Impression    IMPRESSION:  Right lung opacity suspicious for pneumonia.      HETAL BLACKMON MD   UA reflex to Microscopic   Result Value Ref Range    Color Urine Light Yellow     Appearance Urine Clear     Glucose Urine Negative NEG^Negative mg/dL    Bilirubin Urine Negative NEG^Negative    Ketones Urine Negative NEG^Negative mg/dL    Specific Gravity Urine 1.018 1.003 - 1.035    Blood Urine Negative NEG^Negative    pH Urine 5.5 5.0 - 7.0 pH    Protein Albumin Urine Negative NEG^Negative mg/dL    Urobilinogen mg/dL Normal 0.0 - 2.0 mg/dL    Nitrite Urine Negative NEG^Negative    Leukocyte Esterase Urine Negative NEG^Negative    Source Midstream Urine    CT Chest Pulmonary Embolism w Contrast    Narrative    CT CHEST PULMONARY EMBOLISM W CONTRAST    HISTORY: 86 years Male with shortness of  breath    TECHNIQUE: Axial CT imaging of the chest was performed With  intravenous contrast. Coronal and sagittal reconstructions were  obtained.    COMPARISON: None    FINDINGS:  There is no evidence of pulmonary embolism. There is no evidence of  thoracic aortic aneurysm or dissection.    There is enlargement of the main pulmonary artery.  There are very small bilateral pleural effusions. There is mild  dependent atelectasis.    There are is groundglass airspace opacity in the central right upper  lobe and to a lesser extent seen in the left upper lobe and bilateral  lower lobes.    There is osteopenia. Multilevel thoracic compression fractures are  present of mild and moderate severity.      Impression    IMPRESSION: No evidence of pulmonary embolism.    Groundglass opacity in the right upper lobe and to lesser extent the  lower lobes and left upper lobe. Suspect pneumonia, an atypical  infectious or inflammatory process.    Cardiomegaly and enlargement of the pulmonary artery. Consider  long-standing pulmonary arterial hypertension    EBONY MONROY MD   Symptomatic Influenza A/B & SARS-CoV2 (COVID-19) Virus PCR Multiplex    Specimen: Nasopharyngeal   Result Value Ref Range    Flu A/B & SARS-COV-2 PCR Source Nasopharyngeal     SARS-CoV-2 PCR Result NEGATIVE     Influenza A PCR Negative NEG^Negative    Influenza B PCR Negative NEG^Negative    Respiratory Syncytial Virus PCR Negative NEG^Negative    Flu A/B & SARS-CoV-2 PCR Comment       Testing was performed using the Xpert Xpress SARS-CoV2/Flu/RSV Assay on the [employer]   GeneXpert Instrument. Additional information about the Emergency Use Authorization (EUA)   assay can be found via the Lab Guide.     Procalcitonin   Result Value Ref Range    Procalcitonin <0.50 ng/ml       Medications   lamoTRIgine (LaMICtal) tablet 200 mg (200 mg Oral Given 2/19/21 2142)   levothyroxine (SYNTHROID/LEVOTHROID) tablet 100 mcg (has no administration in time range)   metoprolol  succinate ER (TOPROL-XL) 24 hr tablet 100 mg (100 mg Oral Given 2/19/21 2142)   risperiDONE (risperDAL) tablet 2 mg (2 mg Oral Given 2/19/21 2142)   atorvastatin (LIPITOR) tablet 10 mg (10 mg Oral Given 2/19/21 2142)   lidocaine 1 % 0.1-1 mL (has no administration in time range)   lidocaine (LMX4) cream (has no administration in time range)   sodium chloride (PF) 0.9% PF flush 3 mL (has no administration in time range)   sodium chloride (PF) 0.9% PF flush 3 mL (3 mLs Intracatheter Given 2/19/21 2145)   furosemide (LASIX) tablet 20 mg (has no administration in time range)   acetaminophen (TYLENOL) tablet 650 mg (has no administration in time range)   iodixanol (VISIPAQUE 320) injection 100 mL (100 mLs Intravenous Given 2/19/21 1715)   furosemide (LASIX) injection 10 mg (10 mg Intravenous Given 2/19/21 1954)       Assessments & Plan (with Medical Decision Making)   Nontoxic in NAD. He has some rhonchi on right side of lungs. Abd soft and nontender. No obvious bilateral lower extremity swelling. VSS, afebrile.     His lab work shows no leukocytosis, hemoglobin is 12.3, BNP is 787, troponin is normal, CMP is unremarkable, urinalysis appears noninfectious, he is negative Covid and influenza.  Procalcitonin is normal.    CT pulmonary embolism study read as:  No evidence of pulmonary embolism.     Groundglass opacity in the right upper lobe and to lesser extent the  lower lobes and left upper lobe. Suspect pneumonia, an atypical  infectious or inflammatory process.     Cardiomegaly and enlargement of the pulmonary artery. Consider  long-standing pulmonary arterial hypertension.    We attempt to roadtest the patient and he is able to get up and ambulate. However, he appears very sob with an increased respiratory rate and WOB and he tells me that if he is not holding onto something, he feels like he will fall down.     [facility]. Pt is admitted for  observation by Dr. Hartmann for CHF exacerbation/sob.     Urbano Zamora PA-C      I have reviewed the nursing notes.    I have reviewed the findings, diagnosis, plan and need for follow up with the patient.       Current Discharge Medication List          Final diagnoses:   CHF exacerbation (H)       2/19/2021   [facility] [place]     Urbano Zamora PA  02/19/21 2881

## 2021-02-20 ENCOUNTER — APPOINTMENT (OUTPATIENT)
Dept: PHYSICAL THERAPY | Facility: OTHER | Age: 86
End: 2021-02-20
Attending: FAMILY MEDICINE
Payer: COMMERCIAL

## 2021-02-20 ENCOUNTER — APPOINTMENT (OUTPATIENT)
Dept: OCCUPATIONAL THERAPY | Facility: OTHER | Age: 86
End: 2021-02-20
Attending: FAMILY MEDICINE
Payer: COMMERCIAL

## 2021-02-20 VITALS
RESPIRATION RATE: 20 BRPM | OXYGEN SATURATION: 96 % | TEMPERATURE: 98.8 F | SYSTOLIC BLOOD PRESSURE: 99 MMHG | WEIGHT: 148.59 LBS | HEIGHT: 70 IN | HEART RATE: 61 BPM | DIASTOLIC BLOOD PRESSURE: 59 MMHG | BODY MASS INDEX: 21.27 KG/M2

## 2021-02-20 LAB
ANION GAP SERPL CALCULATED.3IONS-SCNC: 7 MMOL/L (ref 3–14)
BUN SERPL-MCNC: 16 MG/DL (ref 7–25)
CALCIUM SERPL-MCNC: 8.8 MG/DL (ref 8.6–10.3)
CHLORIDE SERPL-SCNC: 105 MMOL/L (ref 98–107)
CO2 SERPL-SCNC: 26 MMOL/L (ref 21–31)
CREAT SERPL-MCNC: 1.02 MG/DL (ref 0.7–1.3)
GFR SERPL CREATININE-BSD FRML MDRD: 69 ML/MIN/{1.73_M2}
GLUCOSE SERPL-MCNC: 95 MG/DL (ref 70–105)
POTASSIUM SERPL-SCNC: 3.9 MMOL/L (ref 3.5–5.1)
SODIUM SERPL-SCNC: 138 MMOL/L (ref 134–144)

## 2021-02-20 PROCEDURE — 97530 THERAPEUTIC ACTIVITIES: CPT | Mod: GO | Performed by: OCCUPATIONAL THERAPIST

## 2021-02-20 PROCEDURE — 80048 BASIC METABOLIC PNL TOTAL CA: CPT | Performed by: FAMILY MEDICINE

## 2021-02-20 PROCEDURE — 120N000001 HC R&B MED SURG/OB

## 2021-02-20 PROCEDURE — 99217 PR OBSERVATION CARE DISCHARGE: CPT | Performed by: FAMILY MEDICINE

## 2021-02-20 PROCEDURE — 97116 GAIT TRAINING THERAPY: CPT | Mod: GP

## 2021-02-20 PROCEDURE — 96372 THER/PROPH/DIAG INJ SC/IM: CPT | Performed by: FAMILY MEDICINE

## 2021-02-20 PROCEDURE — G0378 HOSPITAL OBSERVATION PER HR: HCPCS

## 2021-02-20 PROCEDURE — 97165 OT EVAL LOW COMPLEX 30 MIN: CPT | Mod: GO | Performed by: OCCUPATIONAL THERAPIST

## 2021-02-20 PROCEDURE — 250N000011 HC RX IP 250 OP 636: Performed by: FAMILY MEDICINE

## 2021-02-20 PROCEDURE — 97161 PT EVAL LOW COMPLEX 20 MIN: CPT | Mod: GP

## 2021-02-20 PROCEDURE — 36415 COLL VENOUS BLD VENIPUNCTURE: CPT | Performed by: FAMILY MEDICINE

## 2021-02-20 PROCEDURE — 97535 SELF CARE MNGMENT TRAINING: CPT | Mod: GO | Performed by: OCCUPATIONAL THERAPIST

## 2021-02-20 PROCEDURE — 250N000013 HC RX MED GY IP 250 OP 250 PS 637: Performed by: FAMILY MEDICINE

## 2021-02-20 RX ORDER — FOLIC ACID 1 MG/1
1 TABLET ORAL DAILY
COMMUNITY

## 2021-02-20 RX ORDER — FUROSEMIDE 20 MG
10 TABLET ORAL DAILY PRN
Qty: 20 TABLET | Refills: 0 | Status: SHIPPED | OUTPATIENT
Start: 2021-02-20 | End: 2021-05-13

## 2021-02-20 RX ORDER — LISINOPRIL 2.5 MG/1
2.5 TABLET ORAL DAILY
Status: DISCONTINUED | OUTPATIENT
Start: 2021-02-20 | End: 2021-02-20 | Stop reason: HOSPADM

## 2021-02-20 RX ORDER — LISINOPRIL 2.5 MG/1
2.5 TABLET ORAL DAILY
Qty: 30 TABLET | Refills: 0 | Status: SHIPPED | OUTPATIENT
Start: 2021-02-21 | End: 2021-08-05

## 2021-02-20 RX ADMIN — LAMOTRIGINE 200 MG: 100 TABLET ORAL at 10:20

## 2021-02-20 RX ADMIN — LISINOPRIL 2.5 MG: 2.5 TABLET ORAL at 10:20

## 2021-02-20 RX ADMIN — LEVOTHYROXINE SODIUM 100 MCG: 100 TABLET ORAL at 10:20

## 2021-02-20 RX ADMIN — FUROSEMIDE 20 MG: 20 TABLET ORAL at 10:20

## 2021-02-20 RX ADMIN — ENOXAPARIN SODIUM 40 MG: 40 INJECTION SUBCUTANEOUS at 10:20

## 2021-02-20 ASSESSMENT — ACTIVITIES OF DAILY LIVING (ADL)
ADLS_ACUITY_SCORE: 17
VISION_MANAGEMENT: GLASSES AT ALL TIMES
DOING_ERRANDS_INDEPENDENTLY_DIFFICULTY: YES
TOILETING_ISSUES: NO
DRESSING/BATHING_DIFFICULTY: NO
WEAR_GLASSES_OR_BLIND: YES
DIFFICULTY_EATING/SWALLOWING: NO
EQUIPMENT_CURRENTLY_USED_AT_HOME: WALKER, STANDARD
WALKING_OR_CLIMBING_STAIRS: AMBULATION DIFFICULTY, REQUIRES EQUIPMENT
FALL_HISTORY_WITHIN_LAST_SIX_MONTHS: YES
DIFFICULTY_COMMUNICATING: NO
WHICH_OF_THE_ABOVE_FUNCTIONAL_RISKS_HAD_A_RECENT_ONSET_OR_CHANGE?: FALL HISTORY
CONCENTRATING,_REMEMBERING_OR_MAKING_DECISIONS_DIFFICULTY: NO
NUMBER_OF_TIMES_PATIENT_HAS_FALLEN_WITHIN_LAST_SIX_MONTHS: 2
WALKING_OR_CLIMBING_STAIRS_DIFFICULTY: YES

## 2021-02-20 ASSESSMENT — MIFFLIN-ST. JEOR: SCORE: 1365.25

## 2021-02-20 NOTE — DISCHARGE SUMMARY
[facility]  Hospitalist Discharge Summary      Date of Admission:  2/19/2021  Date of Discharge:  2/20/2021  Discharging Provider: Jackson Hartmann MD      Discharge Diagnoses   Principal Problem:    Acute on chronic systolic congestive heart failure (H)  Active Problems:    Dyspnea on exertion    Acquired hypothyroidism    Cardiac pacemaker in situ    Depression        Follow-ups Needed After Discharge   Follow-up Appointments     Follow-up and recommended labs and tests       Follow up with primary care provider, Vipin Iqbal, within 7 days for   hospital follow- up.  The following labs/tests are recommended: Check   weight, blood pressure, BMP.             Unresulted Labs Ordered in the Past 30 Days of this Admission     No orders found for last 31 day(s).      These results will be followed up by Dr. Iqbal    Discharge Disposition   Discharged to home  Condition at discharge: Satisfactory      Hospital Course   Acute on chronic systolic congestive heart failure (H)  86-year-old male presenting from home with increasing shortness of breath over the past 1 to 2 weeks.  Not associated with cough, fever or chills  Known history of systolic CHF with echocardiogram in 2018 showing an EF of 35%.  Followed by cardiology at the [facility] in [place] with history of a pacemaker ICD placement  Patient has an elevated BNP and a somewhat inconclusive chest x-ray.  Infectious etiology seems somewhat low with a normal white count, no fever and a negative procalcitonin  Clinically he does not show obvious pedal edema but is symptomatic and is currently not on a diuretic.  2/20/2021-diuresed well with IV Lasix, continues to complain of shortness of breath with dyspnea on exertion this morning.  This point will admit inpatient status, continue oral Lasix at low dose.  We will add low-dose ACE inhibitor.  Plan on checking echocardiogram when available.  Would like to go home, will send home  on low-dose Lasix, 10 mg daily, lisinopril, 2.5 mg daily.  Acid follow-up with his primary care doctor in the following week.  Would benefit with an echocardiogram when able.    Dyspnea on exertion  Feeling more short of air over the past 2 weeks especially with exertion  Has had a nonproductive cough but denies any other infectious symptoms  Covid-19 testing is negative  As above, will treat for CHF exacerbation    Acquired hypothyroidism  History of radiation of his head and neck 3 years ago for some type of cancer  Has been on Synthroid replacement, continue    Cardiac pacemaker in situ  Placed by cardiology with history of systolic CHF  Continue outpatient follow-up    Depression  Controlled taking Risperdal and lamotrigine  Continue home dosing        Consultations This Hospital Stay   CARE MANAGEMENT / SOCIAL WORK IP CONSULT  NUTRITION SERVICES ADULT IP CONSULT  PHYSICAL THERAPY ADULT IP CONSULT  OCCUPATIONAL THERAPY ADULT IP CONSULT    Code Status   Full Code    Time Spent on this Encounter   I, Jackson Hartmann MD, personally saw the patient today and spent less than or equal to 30 minutes discharging this patient.       Jackson Hartmann MD  [facility]  [address]  [place] [address]  Phone: [phone]  Fax: [phone]  ______________________________________________________________________    Physical Exam   Vital Signs: Temp: 98.8  F (37.1  C) Temp src: Tympanic BP: 99/59 Pulse: 61   Resp: 20 SpO2: 96 % O2 Device: None (Room air)    Weight: 148 lbs 9.44 oz  Constitutional: awake, alert, cooperative, no apparent distress, and appears stated age  Respiratory: Appears short of breath when he is up and about although not requiring oxygen.  No wheezing or rales heard  Cardiovascular: regular rate and rhythm  GI: soft, non-distended and non-tender       Primary Care Physician   Vipin Iqbal    Discharge Orders      Reason for your hospital stay    Admitted with  increased shortness of breath likely due to worsening CHF     Follow-up and recommended labs and tests     Follow up with primary care provider, Vipin Iqbal, within 7 days for hospital follow- up.  The following labs/tests are recommended: Check weight, blood pressure, BMP.     Activity    Your activity upon discharge: activity as tolerated     Full Code     Diet    Follow this diet upon discharge: Orders Placed This Encounter      2 Gram Sodium Diet No Caffeine for 24 hours (once tests completed, may have caffeine)       Significant Results and Procedures   Most Recent 3 CBC's:  Recent Labs   Lab Test 02/19/21  1519 07/04/20  0926 05/18/18  1755   WBC 5.2 7.3 6.6   HGB 12.3* 13.2* 14.0   MCV 89 90 90   * 161 161     Most Recent 3 BMP's:  Recent Labs   Lab Test 02/20/21  0445 02/19/21  1519 07/04/20  0926    136 135   POTASSIUM 3.9 3.6 4.1   CHLORIDE 105 103 103   CO2 26 27 24   BUN 16 19 16   CR 1.02 1.26 1.01   ANIONGAP 7 6 8   ANSON 8.8 9.4 9.5   GLC 95 174* 167*     Most Recent 3 BNP's:  Recent Labs   Lab Test 02/19/21  1519   NTBNPI 787*   ,   Results for orders placed or performed during the hospital encounter of 02/19/21   XR Chest Port 1 View    Narrative    PROCEDURE:  XR CHEST PORT 1 VW    HISTORY:  sob, hx chf.     COMPARISON:  2018    FINDINGS:   The heart is enlarged. There is a transvenous pacemaker. There is some  interstitial thickening seen in both lungs. There are some confluent  areas of increased density seen just above the minor fissure in the  right upper lobe suspicious for pneumonia. No pleural effusion or  pneumothorax.      Impression    IMPRESSION:  Right lung opacity suspicious for pneumonia.      HETAL BLACKMON MD   CT Chest Pulmonary Embolism w Contrast    Narrative    CT CHEST PULMONARY EMBOLISM W CONTRAST    HISTORY: 86 years Male with shortness of breath    TECHNIQUE: Axial CT imaging of the chest was performed With  intravenous contrast. Coronal and sagittal  reconstructions were  obtained.    COMPARISON: None    FINDINGS:  There is no evidence of pulmonary embolism. There is no evidence of  thoracic aortic aneurysm or dissection.    There is enlargement of the main pulmonary artery.  There are very small bilateral pleural effusions. There is mild  dependent atelectasis.    There are is groundglass airspace opacity in the central right upper  lobe and to a lesser extent seen in the left upper lobe and bilateral  lower lobes.    There is osteopenia. Multilevel thoracic compression fractures are  present of mild and moderate severity.      Impression    IMPRESSION: No evidence of pulmonary embolism.    Groundglass opacity in the right upper lobe and to lesser extent the  lower lobes and left upper lobe. Suspect pneumonia, an atypical  infectious or inflammatory process.    Cardiomegaly and enlargement of the pulmonary artery. Consider  long-standing pulmonary arterial hypertension    EBONY MONROY MD       Discharge Medications   Current Discharge Medication List      START taking these medications    Details   lisinopril (ZESTRIL) 2.5 MG tablet Take 1 tablet (2.5 mg) by mouth daily  Qty: 30 tablet, Refills: 0    Associated Diagnoses: Acute on chronic systolic congestive heart failure (H)         CONTINUE these medications which have CHANGED    Details   furosemide (LASIX) 20 MG tablet Take 0.5 tablets (10 mg) by mouth daily as needed Use as needed daily for swelling for heart failure  Qty: 20 tablet, Refills: 0    Associated Diagnoses: Acute on chronic systolic congestive heart failure (H)         CONTINUE these medications which have NOT CHANGED    Details   folic acid (FOLVITE) 1 MG tablet Take 1 mg by mouth daily      amitriptyline (ELAVIL) 10 MG tablet Take 20 mg by mouth At Bedtime      brimonidine (ALPHAGAN) 0.2 % ophthalmic solution PLACE 1 DROP INTO THE LEFT EYE TWO TIMES A DAY  Refills: 99      calcitonin, salmon, (MIACALCIN) 200 UNIT/ACT nasal spray Spray 1  spray into one nostril alternating nostrils daily Alternate nostril each day.      dorzolamide-timolol (COSOPT) 2-0.5 % ophthalmic solution PLACE 1 DROP INTO THE LEFT EYE TWO TIMES DAILY  Refills: 99      lamoTRIgine (LAMICTAL) 200 MG tablet Take 200 mg by mouth 2 times daily.      levothyroxine (SSYNTHROID,LEVOTHROID) 100 MCG tablet Take 100 mcg by mouth daily.      metoprolol succinate ER (TOPROL-XL) 100 MG 24 hr tablet Take 50 mg by mouth At Bedtime       risperiDONE (RISPERDAL) 4 MG tablet Take 2 mg by mouth At Bedtime       simvastatin (ZOCOR) 40 MG tablet Take 20 mg by mouth At Bedtime            Allergies   No Known Allergies

## 2021-02-20 NOTE — PROGRESS NOTES
02/20/21 1200   Quick Adds   Type of Visit Initial Occupational Therapy Evaluation   Living Environment   People in home alone;spouse   Current Living Arrangements house   Home Accessibility no concerns   Transportation Anticipated family or friend will provide   Self-Care   Usual Activity Tolerance good   Current Activity Tolerance fair   Equipment Currently Used at Home walker, rolling   Activity/Exercise/Self-Care Comment limited activity tolerance due to SOB   Disability/Function   Hearing Difficulty or Deaf no   Wear Glasses or Blind yes   Vision Management glasses   Concentrating, Remembering or Making Decisions Difficulty no   Difficulty Communicating no   Difficulty Eating/Swallowing no   Walking or Climbing Stairs Difficulty yes   Walking or Climbing Stairs ambulation difficulty, requires equipment   Mobility Management 4WW   Dressing/Bathing Difficulty no   Toileting issues no   Doing Errands Independently Difficulty (such as shopping) yes   Cognitive Status Examination   Orientation Status orientation to person, place and time   Follows Commands WFL   Range of Motion Comprehensive   General Range of Motion no range of motion deficits identified   Bed Mobility   Bed Mobility supine-sit   Supine-Sit Brooke (Bed Mobility) independent   Transfers   Transfers bed-chair transfer;sit-stand transfer   Transfer Skill: Bed to Chair/Chair to Bed   Bed-Chair Brooke (Transfers) modified independence   Assistive Device (Bed-Chair Transfers) rolling walker   Sit-Stand Transfer   Sit-Stand Brooke (Transfers) independent   Clinical Impression   Criteria for Skilled Therapeutic Interventions Met (OT) other (see comments)  (Evaluation only)   OT Diagnosis SOB with activity   OT Problem List-Impairments impacting ADL activity tolerance impaired   Assessment of Occupational Performance 1-3 Performance Deficits   Identified Performance Deficits mobility, self cares   Planned Therapy Interventions (OT)  other (see comments)  (Evaluation only)   Clinical Decision Making Complexity (OT) low complexity   Therapy Frequency (OT) 1x eval and treat   Risk & Benefits of therapy have been explained patient   OT Discharge Planning    OT Discharge Recommendation (DC Rec) home   OT Rationale for DC Rec pt declines further OT upon discharge, able to complete basic self cares and mobility with rest breaks.    OT Brief overview of current status  Pt at baseline level of function, independent with self cares, mobility   Total Evaluation Time (Minutes)   Total Evaluation Time (Minutes) 15

## 2021-02-20 NOTE — ASSESSMENT & PLAN NOTE
History of radiation of his head and neck 3 years ago for some type of cancer  Has been on Synthroid replacement, continue

## 2021-02-20 NOTE — PLAN OF CARE
Weak and short of breath with conversation, LS diminished, skin intact but appears fragile and multiple small bruises scattered throughout. Adequate intake, no urine output so far this shift.    Shantel Mckinley RN on 2/20/2021 at 9:58 AM

## 2021-02-20 NOTE — PROGRESS NOTES
ICU notified this nurse of ST elevation on telemetry. Pt sleeping during time of event. MD Hartmann notified. No new orders at this time.  Geeta Pringle RN on 2/19/2021 at 9:40 PM

## 2021-02-20 NOTE — PROGRESS NOTES
SAFETY CHECKLIST  ID Bands and Risk clasps correct and in place (DNR, Fall risk, Allergy, Latex, Limb):  Yes  All Lines Reconciled and labeled correctly: Yes  Whiteboard updated:Yes  Environmental interventions (bed/chair alarm on, call light, side rails, restraints, sitter....): Yes  Verify Tele #: [id number]

## 2021-02-20 NOTE — ASSESSMENT & PLAN NOTE
Feeling more short of air over the past 2 weeks especially with exertion  Has had a nonproductive cough but denies any other infectious symptoms  Covid-19 testing is negative  As above, will treat for CHF exacerbation

## 2021-02-20 NOTE — ASSESSMENT & PLAN NOTE
86-year-old male presenting from home with increasing shortness of breath over the past 1 to 2 weeks.  Not associated with cough, fever or chills  Known history of systolic CHF with echocardiogram in 2018 showing an EF of 35%.  Followed by cardiology at the [facility] in [place] with history of a pacemaker ICD placement  Patient has an elevated BNP and a somewhat inconclusive chest x-ray.  Infectious etiology seems somewhat low with a normal white count, no fever and a negative procalcitonin  Clinically he does not show obvious pedal edema but is symptomatic and is currently not on a diuretic.  2/20/2021-diuresed well with IV Lasix, continues to complain of shortness of breath with dyspnea on exertion this morning.  This point will admit inpatient status, continue oral Lasix at low dose.  We will add low-dose ACE inhibitor.  Plan on checking echocardiogram when available.  Would like to go home, will send home on low-dose Lasix, 10 mg daily, lisinopril, 2.5 mg daily.  Acid follow-up with his primary care doctor in the following week.  Would benefit with an echocardiogram when able.

## 2021-02-20 NOTE — ED NOTES
Pt up walking with [facility], tolerated well, sats stayed 94% during and after ambulation.  [facility] reports pt was breathing heavier after walk, provider and primary nurse aware.

## 2021-02-20 NOTE — PROGRESS NOTES
Pt A&O x4. Calm and cooperative. Resting comfortably, no SOB. Expiratory wheezes noted. Infrequent congested cough. VSS. Will continue to monitor. Temp: 97.5  F (36.4  C) Temp src: Tympanic BP: 110/66 Pulse: 81   Resp: 20 SpO2: 96 % O2 Device: None (Room air)

## 2021-02-20 NOTE — PROGRESS NOTES
02/20/21 1300   Living Environment   People in home spouse   Current Living Arrangements house   Home Accessibility no concerns   Transportation Anticipated family or friend will provide   Self-Care   Usual Activity Tolerance good   Current Activity Tolerance fair   Equipment Currently Used at Home walker, rolling   General Information   Onset of Illness/Injury or Date of Surgery 02/19/21   Referring Physician Dr. Hartmann   General Observations Pt is eager to return home. Willing to work with therapies.   Cognition   Orientation Status (Cognition) oriented x 4   Affect/Mental Status (Cognition) WNL   Follows Commands (Cognition) WNL   Safety Deficit (Cognition) minimal deficit;insight into deficits/self-awareness   Strength   Strength Comments Pt has fair strength for activities however he lacks the endurance. Unable to tolerate extensive activity   Bed Mobility   Assistive Device (Bed Mobility) bed rails   Comment (Bed Mobility) Supine to sit with SBA   Transfers   Transfers sit-stand transfer   Transfer Safety Comments SBA for transfers with 4WW   Sit-Stand Transfer   Sit-Stand Josephine (Transfers) supervision   Assistive Device (Sit-Stand Transfers) walker, 4-wheeled   Gait/Stairs (Locomotion)   Assistive Device (Gait) walker, 4-wheeled   Distance in Feet (Required for LE Total Joints) 200   Pattern (Gait) step-to   Deviations/Abnormal Patterns (Gait) gait speed decreased   Maintains Weight-bearing Status (Gait) able to maintain   Comment (Gait/Stairs) Ambulated in hallway. Pt was quite wiped out after this activity. O2 sats on RA immediately after activity 93-96% however very fatigued   Balance   Balance Comments no overt loss of balance   Clinical Impression   Criteria for Skilled Therapeutic Intervention evaluation only   PT Diagnosis (PT) impaired mobility   Influenced by the following impairments poor endurance   Functional limitations due to impairments limited activity tolerance   Clinical  Presentation Stable/Uncomplicated   Clinical Decision Making (Complexity) low complexity   Therapy Frequency (PT) One time eval and treatment only   Predicted Duration of Therapy Intervention (days/wks) pt to d/c today   Risk & Benefits of therapy have been explained evaluation/treatment results reviewed   PT Discharge Planning    PT Discharge Recommendation (DC Rec) home   PT Rationale for DC Rec Pt declines additional therapy. Pt likely at baseline at this point   PT Brief overview of current status  Ambulated with 4WW and CGA. Pt has a low tolerance for activity and requires frequent breaks at home but likely close to baseline.    Total Evaluation Time   Total Evaluation Time (Minutes) 15

## 2021-02-20 NOTE — PROGRESS NOTES
[facility]    Medicine Progress Note - Hospitalist Service       Date of Admission:  2/19/2021  Assessment & Plan       Acute on chronic systolic congestive heart failure (H)  86-year-old male presenting from home with increasing shortness of breath over the past 1 to 2 weeks.  Not associated with cough, fever or chills  Known history of systolic CHF with echocardiogram in 2018 showing an EF of 35%.  Followed by cardiology at the [facility] in [place] with history of a pacemaker ICD placement  Patient has an elevated BNP and a somewhat inconclusive chest x-ray.  Infectious etiology seems somewhat low with a normal white count, no fever and a negative procalcitonin  Clinically he does not show obvious pedal edema but is symptomatic and is currently not on a diuretic.  2/20/2021-diuresed well with IV Lasix, continues to complain of shortness of breath with dyspnea on exertion this morning.  This point will admit inpatient status, continue oral Lasix at low dose.  We will add low-dose ACE inhibitor.  Plan on checking echocardiogram when available.    Dyspnea on exertion  Feeling more short of air over the past 2 weeks especially with exertion  Has had a nonproductive cough but denies any other infectious symptoms  Covid-19 testing is negative  As above, will treat for CHF exacerbation    Acquired hypothyroidism  History of radiation of his head and neck 3 years ago for some type of cancer  Has been on Synthroid replacement, continue    Cardiac pacemaker in situ  Placed by cardiology with history of systolic CHF  Continue outpatient follow-up    Depression  Controlled taking Risperdal and lamotrigine  Continue home dosing           Diet: 2 Gram Sodium Diet No Caffeine for 24 hours (once tests completed, may have caffeine)    DVT Prophylaxis: Enoxaparin (Lovenox) SQ  Caceres Catheter: not present  Code Status: Full Code           Disposition Plan   Expected discharge: 2 - 3 days,  recommended to prior living arrangement once mental status at baseline and Feeling better, safe to go home.  Entered: Jackson Hartmann MD 02/20/2021, 8:25 AM       The patient's care was discussed with the Patient.    Jackson Hartmann MD  Hospitalist Service  [facility]  Contact information available via [facility] Paging/Directory    ______________________________________________________________________    Interval History   Claims not sleeping well overnight, up several times to urinate.  Complains of feeling short of air at rest and worse with exertion.  Denies chest pain, nausea or vomiting.    Data reviewed today: I reviewed all medications, new labs and imaging results over the last 24 hours. I personally reviewed no images or EKG's today.    Physical Exam   Vital Signs: Temp: 97.9  F (36.6  C) Temp src: Tympanic BP: 112/64 Pulse: 79   Resp: 20 SpO2: 95 % O2 Device: None (Room air)    Weight: 147 lbs 9.6 oz  Constitutional: Awake oriented.  Not on oxygen but appears slightly dyspneic as he talks.  Respiratory: Lungs are clear without wheezing or rales  Cardiovascular: regular rate and rhythm  GI: normal bowel sounds, soft and non-distended    Data   Recent Labs   Lab 02/20/21  0445 02/19/21  1519   WBC  --  5.2   HGB  --  12.3*   MCV  --  89   PLT  --  148*   INR  --  1.06    136   POTASSIUM 3.9 3.6   CHLORIDE 105 103   CO2 26 27   BUN 16 19   CR 1.02 1.26   ANIONGAP 7 6   ANSON 8.8 9.4   GLC 95 174*   ALBUMIN  --  4.0   PROTTOTAL  --  7.1   BILITOTAL  --  0.9   ALKPHOS  --  68   ALT  --  10   AST  --  16   LIPASE  --  17

## 2021-02-20 NOTE — H&P
[facility]    History and Physical - Hospitalist Service       Date of Admission:  2/19/2021    Assessment & Plan   Tashi Santana is a 86 year old male admitted on 2/19/2021. He presents from home with increasing shortness of breath for the past 2 weeks.  Patient has history of CHF, followed at the [facility] in [place] diagnosed 12 years ago and has an echo done 3 years ago showing an EF of 35% with pacemaker insertion and ICD placement.  Patient currently not on diuretic, having been stopped in the past year due to weight loss.  In the ED short of breath with exertion but is not hypoxemic with lab work showing an elevated BNP and chest x-ray/chest CT not demonstrating PE but there is some concern for groundglass changes.  Clinically patient seems to be more having CHF exacerbation, doubt infectious etiology without fever and he has a negative procalcitonin.  The plan tonight will be to give IV Lasix, he has received 1 dose and continue oral Lasix tomorrow.  An echocardiogram should be done, but unable to do this here for the next several days due to the weekend.  We will hold antibiotic treatment at this point and reevaluate in the a.m.    Acute on chronic systolic congestive heart failure (H)  86-year-old male presenting from home with increasing shortness of breath over the past 1 to 2 weeks.  Not associated with cough, fever or chills  Known history of systolic CHF with echocardiogram in 2018 showing an EF of 35%.  Followed by cardiology at the [facility] in [place] with history of a pacemaker ICD placement  Patient has an elevated BNP and a somewhat inconclusive chest x-ray.  Infectious etiology seems somewhat low with a normal white count, no fever and a negative procalcitonin  Clinically he does not show obvious pedal edema but is symptomatic and is currently not on a diuretic.  Has been given some IV Lasix and will continue oral Lasix starting tomorrow.  Would  like to check an echocardiogram, but unable to do so until Monday  Reevaluate tomorrow and if feeling better, likely can be discharged with outpatient follow-up by his cardiologist    Dyspnea on exertion  Feeling more short of air over the past 2 weeks especially with exertion  Has had a nonproductive cough but denies any other infectious symptoms  Covid-19 testing is negative  As above, will treat for CHF exacerbation    Acquired hypothyroidism  History of radiation of his head and neck 3 years ago for some type of cancer  Has been on Synthroid replacement, continue    Cardiac pacemaker in situ  Placed by cardiology with history of systolic CHF  Continue outpatient follow-up    Depression  Controlled taking Risperdal and lamotrigine  Continue home dosing         Diet: 2 Gram Sodium Diet No Caffeine for 24 hours (once tests completed, may have caffeine)    DVT Prophylaxis: Observation status  Caceres Catheter: not present  Code Status: Full Code           Disposition Plan   Expected discharge: 1 to 2 days, recommended to prior living arrangement once Breathing better, determined safe to return home.  Entered: Jackson Hartmann MD 02/19/2021, 9:00 PM     The patient's care was discussed with the Patient.    Jackson Hartmann MD  [facility]  Contact information available via [facility] Paging/Directory      ______________________________________________________________________    Chief Complaint   86-year-old male presenting with increased shortness of breath    History is obtained from the patient, electronic health record and emergency department physician    History of Present Illness   Tashi Santana is a 86 year old male who presents from home with increased shortness of breath over the past 2 weeks.  Patient is a [facility] patient with history of CHF diagnosed in 2012.  Cause for the CHF has not been determined per the patient with echocardiogram last done in May 2018 showing an EF of  35%.  Patient has pacemaker and ICD in place.  He has had an occasional cough, nonproductive and denies fever, chills, sweats.  Shortness of breath is worse with exertion.  Patient previously had been on a diuretic, this was held several months ago and not been restarted due to overall weight loss.  Patient denies chest pain, ICD malfunction.  He denies increased weight gain or pedal edema.  He is not sure of his medications and unfortunately we do not have access to his list due to the hour and the fact that [facility].  He denies abdominal pain, nausea or vomiting, diarrhea or urinary symptoms.    Review of Systems    The 10 point Review of Systems is negative other than noted in the HPI or here.       Past Medical History    I have reviewed this patient's medical history and updated it with pertinent information if needed.   Past Medical History:   Diagnosis Date     Acquired hypothyroidism     hx of neck radiation 2017     Depression      Systolic congestive heart failure (H) 2012       Past Surgical History   I have reviewed this patient's surgical history and updated it with pertinent information if needed.  Past Surgical History:   Procedure Laterality Date     CA ANESTH,PACEMAKER INSERTION         Social History   I have reviewed this patient's social history and updated it with pertinent information if needed.  Social History     Tobacco Use     Smoking status: Former Smoker     Smokeless tobacco: Never Used   Substance Use Topics     Alcohol use: No     Drug use: Unknown     Types: Other     Comment: Drug use: No       Family History   I have reviewed this patient's family history and updated it with pertinent information if needed.  Family History   Problem Relation Age of Onset     Lung Cancer Mother      Alzheimer Disease Father        Prior to Admission Medications   Prior to Admission Medications   Prescriptions Last Dose Informant Patient Reported? Taking?   amitriptyline (ELAVIL) 10 MG  tablet   Yes No   Sig: Take 20 mg by mouth At Bedtime   brimonidine (ALPHAGAN) 0.2 % ophthalmic solution   Yes No   Sig: PLACE 1 DROP INTO THE LEFT EYE TWO TIMES A DAY   calcitonin, salmon, (MIACALCIN) 200 UNIT/ACT nasal spray   Yes No   Sig: Spray 1 spray into one nostril alternating nostrils daily Alternate nostril each day.   dorzolamide-timolol (COSOPT) 2-0.5 % ophthalmic solution   Yes No   Sig: PLACE 1 DROP INTO THE LEFT EYE TWO TIMES DAILY   furosemide (LASIX) 20 MG tablet   Yes No   Sig: Take 10 mg by mouth daily as needed Use as needed daily for swelling for heart failure   lamoTRIgine (LAMICTAL) 200 MG tablet   Yes No   Sig: Take 200 mg by mouth 2 times daily.   levothyroxine (SSYNTHROID,LEVOTHROID) 100 MCG tablet   Yes No   Sig: Take 100 mcg by mouth daily.   metoprolol (TOPROL-XL) 200 MG 24 hr tablet   Yes No   Sig: Take 100 mg by mouth At Bedtime    risperiDONE (RISPERDAL) 4 MG tablet   Yes No   Sig: Take 2 mg by mouth At Bedtime    simvastatin (ZOCOR) 40 MG tablet   Yes No   Sig: Take 20 mg by mouth At Bedtime       Facility-Administered Medications: None     Allergies   No Known Allergies    Physical Exam   Vital Signs: Temp: 97.1  F (36.2  C) Temp src: Tympanic BP: 137/86 Pulse: 85   Resp: 26 SpO2: 97 % O2 Device: None (Room air)    Weight: 147 lbs 9.6 oz    General Appearance: Elderly male quite thin lying on the cot in no obvious distress, not requiring oxygen  Eyes: Pupils are equal, reactive, EOM intact  HEENT: Nose mouth and throat unremarkable  Respiratory: Lungs somewhat diminished breath sounds, no obvious crackles  Cardiovascular: Regular rate.  No murmur heard.,  No peripheral edema  GI: Abdomen is soft, nontender  Lymph/Hematologic: Cervical nodes negative  Genitourinary: Not examined  Skin: No lesions or rashes  Musculoskeletal: Moving arms and legs without difficulty  Neurologic: Diminished sensation from the mid shin down.  Otherwise no focal deficits, cranial nerves normal.  He has an  occasional 60 Hz tremor similar to a Parkinson's tremor involving his right arm  Psychiatric: Mood and affect normal    Data   Data reviewed today: I reviewed all medications, new labs and imaging results over the last 24 hours. I personally reviewed the chest x-ray image(s) showing Right lung possible infiltrates versus scar and the chest CT image(s) showing Per radiology not showing pulmonary embolism, some groundglass changes in both lobes.    Results for orders placed or performed during the hospital encounter of 02/19/21 (from the past 24 hour(s))   CBC with platelets differential   Result Value Ref Range    WBC 5.2 4.0 - 11.0 10e9/L    RBC Count 4.20 (L) 4.4 - 5.9 10e12/L    Hemoglobin 12.3 (L) 13.3 - 17.7 g/dL    Hematocrit 37.3 (L) 40.0 - 53.0 %    MCV 89 78 - 100 fl    MCH 29.3 26.5 - 33.0 pg    MCHC 33.0 31.5 - 36.5 g/dL    RDW 14.2 10.0 - 15.0 %    Platelet Count 148 (L) 150 - 450 10e9/L    Diff Method Automated Method     % Neutrophils 62.9 %    % Lymphocytes 23.9 %    % Monocytes 10.9 %    % Eosinophils 1.5 %    % Basophils 0.6 %    % Immature Granulocytes 0.2 %    Absolute Neutrophil 3.3 1.6 - 8.3 10e9/L    Absolute Lymphocytes 1.3 0.8 - 5.3 10e9/L    Absolute Monocytes 0.6 0.0 - 1.3 10e9/L    Absolute Eosinophils 0.1 0.0 - 0.7 10e9/L    Absolute Basophils 0.0 0.0 - 0.2 10e9/L    Abs Immature Granulocytes 0.0 0 - 0.4 10e9/L   INR   Result Value Ref Range    INR 1.06 0.86 - 1.14   Partial thromboplastin time   Result Value Ref Range    PTT 37 22 - 37 sec   Comprehensive metabolic panel   Result Value Ref Range    Sodium 136 134 - 144 mmol/L    Potassium 3.6 3.5 - 5.1 mmol/L    Chloride 103 98 - 107 mmol/L    Carbon Dioxide 27 21 - 31 mmol/L    Anion Gap 6 3 - 14 mmol/L    Glucose 174 (H) 70 - 105 mg/dL    Urea Nitrogen 19 7 - 25 mg/dL    Creatinine 1.26 0.70 - 1.30 mg/dL    GFR Estimate 54 (L) >60 mL/min/[1.73_m2]    GFR Estimate If Black 66 >60 mL/min/[1.73_m2]    Calcium 9.4 8.6 - 10.3 mg/dL     Bilirubin Total 0.9 0.3 - 1.0 mg/dL    Albumin 4.0 3.5 - 5.7 g/dL    Protein Total 7.1 6.4 - 8.9 g/dL    Alkaline Phosphatase 68 34 - 104 U/L    ALT 10 7 - 52 U/L    AST 16 13 - 39 U/L   Lipase   Result Value Ref Range    Lipase 17 11 - 82 U/L   Lactic acid whole blood   Result Value Ref Range    Lactic Acid 1.3 0.7 - 2.0 mmol/L   Troponin GH   Result Value Ref Range    Troponin 16.2 <34.0 pg/mL   Nt probnp inpatient (BNP)   Result Value Ref Range    N-Terminal Pro BNP Inpatient 787 (H) 0 - 100 pg/mL   XR Chest Port 1 View    Narrative    PROCEDURE:  XR CHEST PORT 1 VW    HISTORY:  sob, hx chf.     COMPARISON:  2018    FINDINGS:   The heart is enlarged. There is a transvenous pacemaker. There is some  interstitial thickening seen in both lungs. There are some confluent  areas of increased density seen just above the minor fissure in the  right upper lobe suspicious for pneumonia. No pleural effusion or  pneumothorax.      Impression    IMPRESSION:  Right lung opacity suspicious for pneumonia.      HETAL BLACKMON MD   UA reflex to Microscopic   Result Value Ref Range    Color Urine Light Yellow     Appearance Urine Clear     Glucose Urine Negative NEG^Negative mg/dL    Bilirubin Urine Negative NEG^Negative    Ketones Urine Negative NEG^Negative mg/dL    Specific Gravity Urine 1.018 1.003 - 1.035    Blood Urine Negative NEG^Negative    pH Urine 5.5 5.0 - 7.0 pH    Protein Albumin Urine Negative NEG^Negative mg/dL    Urobilinogen mg/dL Normal 0.0 - 2.0 mg/dL    Nitrite Urine Negative NEG^Negative    Leukocyte Esterase Urine Negative NEG^Negative    Source Midstream Urine    CT Chest Pulmonary Embolism w Contrast    Narrative    CT CHEST PULMONARY EMBOLISM W CONTRAST    HISTORY: 86 years Male with shortness of breath    TECHNIQUE: Axial CT imaging of the chest was performed With  intravenous contrast. Coronal and sagittal reconstructions were  obtained.    COMPARISON: None    FINDINGS:  There is no evidence of  pulmonary embolism. There is no evidence of  thoracic aortic aneurysm or dissection.    There is enlargement of the main pulmonary artery.  There are very small bilateral pleural effusions. There is mild  dependent atelectasis.    There are is groundglass airspace opacity in the central right upper  lobe and to a lesser extent seen in the left upper lobe and bilateral  lower lobes.    There is osteopenia. Multilevel thoracic compression fractures are  present of mild and moderate severity.      Impression    IMPRESSION: No evidence of pulmonary embolism.    Groundglass opacity in the right upper lobe and to lesser extent the  lower lobes and left upper lobe. Suspect pneumonia, an atypical  infectious or inflammatory process.    Cardiomegaly and enlargement of the pulmonary artery. Consider  long-standing pulmonary arterial hypertension    EBONY MONROY MD   Symptomatic Influenza A/B & SARS-CoV2 (COVID-19) Virus PCR Multiplex    Specimen: Nasopharyngeal   Result Value Ref Range    Flu A/B & SARS-COV-2 PCR Source Nasopharyngeal     SARS-CoV-2 PCR Result NEGATIVE     Influenza A PCR Negative NEG^Negative    Influenza B PCR Negative NEG^Negative    Respiratory Syncytial Virus PCR Negative NEG^Negative    Flu A/B & SARS-CoV-2 PCR Comment       Testing was performed using the Xpert Xpress SARS-CoV2/Flu/RSV Assay on the [employer]   GeneXpert Instrument. Additional information about the Emergency Use Authorization (EUA)   assay can be found via the Lab Guide.     Procalcitonin   Result Value Ref Range    Procalcitonin <0.50 ng/ml

## 2021-02-20 NOTE — PROGRESS NOTES
No acute changes. VSS. Expiratory wheezes remain present. Sleeping off and on throughout night. Resting comfortably at this time. Will continue to monitor. Temp: 97.9  F (36.6  C) Temp src: Tympanic BP: 112/64 Pulse: 78   Resp: 20 SpO2: 95 % O2 Device: None (Room air)

## 2021-02-20 NOTE — PROGRESS NOTES
"[facility] ADMISSION NOTE    Patient admitted to room 333 at approximately 2042 via wheel chair from emergency room. Patient was accompanied by transport tech.     Verbal SBAR report received from LAYA Connor prior to patient arrival.     Patient ambulated to bed with one assist. Patient alert and oriented X 3. The patient is not having any pain.  . Admission vital signs: Blood pressure 110/66, pulse 81, temperature 97.5  F (36.4  C), temperature source Tympanic, resp. rate 20, height 1.778 m (5' 10\"), weight 67 kg (147 lb 9.6 oz), SpO2 96 %. Patient was oriented to plan of care, call light, bed controls, tv, telephone, bathroom and visiting hours.     Risk Assessment    The following safety risks were identified during admission: fall. Yellow risk band applied: YES.     Skin Initial Assessment    This writer admitted this patient and completed a full skin assessment and Jose Alberto score in the Adult PCS flowsheet. Appropriate interventions initiated as needed.     Jose Alberto Risk Assessment  Sensory Perception: 3-->slightly limited  Moisture: 4-->rarely moist  Activity: 3-->walks occasionally  Mobility: 3-->slightly limited  Nutrition: 3-->adequate  Friction and Shear: 3-->no apparent problem  Jose Alberto Score: 19    Education    Patient has a [place] to Observation order: Yes  Observation education completed and documented: Yes      Geeta Pringle RN    "

## 2021-02-20 NOTE — PLAN OF CARE
Temp: 97.5  F (36.4  C) Temp src: Tympanic BP: 110/66 Pulse: 81   Resp: 20 SpO2: 96 % O2 Device: None (Room air)     Pt is here for CHF. Denies having pain. Is A&O X4. [place]. Lung sounds with expiratory wheezes in upper lobes. Has dyspnea with exertion. Congested, productive cough present. Is paced. On telemetry. No edema noted. Scattered bruising noted. Has chronic numbness/tingling to BLE. IV saline locked. Voiding appropriately. Geeta Pringle RN on 2/19/2021 at 10:50 PM

## 2021-02-21 LAB
INTERPRETATION ECG - MUSE: NORMAL
INTERPRETATION ECG - MUSE: NORMAL

## 2021-02-23 LAB
MDC_IDC_PG_IMPLANT_DTM: NORMAL
MDC_IDC_PG_MFG: NORMAL
MDC_IDC_PG_MODEL: NORMAL
MDC_IDC_PG_SERIAL: NORMAL
MDC_IDC_PG_TYPE: NORMAL
MDC_IDC_SESS_CLINIC_NAME: NORMAL
MDC_IDC_SESS_DTM: NORMAL
MDC_IDC_SESS_TYPE: NORMAL

## 2021-03-26 ENCOUNTER — ALLIED HEALTH/NURSE VISIT (OUTPATIENT)
Dept: FAMILY MEDICINE | Facility: OTHER | Age: 86
End: 2021-03-26
Attending: FAMILY MEDICINE
Payer: MEDICARE

## 2021-03-26 DIAGNOSIS — Z20.822 COVID-19 RULED OUT: Primary | ICD-10-CM

## 2021-03-26 LAB
SARS-COV-2 RNA RESP QL NAA+PROBE: NORMAL
SPECIMEN SOURCE: NORMAL

## 2021-03-26 PROCEDURE — U0005 INFEC AGEN DETEC AMPLI PROBE: HCPCS | Mod: ZL | Performed by: FAMILY MEDICINE

## 2021-03-26 PROCEDURE — U0003 INFECTIOUS AGENT DETECTION BY NUCLEIC ACID (DNA OR RNA); SEVERE ACUTE RESPIRATORY SYNDROME CORONAVIRUS 2 (SARS-COV-2) (CORONAVIRUS DISEASE [COVID-19]), AMPLIFIED PROBE TECHNIQUE, MAKING USE OF HIGH THROUGHPUT TECHNOLOGIES AS DESCRIBED BY CMS-2020-01-R: HCPCS | Mod: ZL | Performed by: FAMILY MEDICINE

## 2021-03-26 PROCEDURE — C9803 HOPD COVID-19 SPEC COLLECT: HCPCS

## 2021-03-26 NOTE — PROCEDURES
Patient swabbed for COVID-19 testing.  Mary Ann Landon LPN on 3/26/2021 at 9:26 AM    Surgery on 3-29-21 at [facility].

## 2021-03-27 LAB
LABORATORY COMMENT REPORT: NORMAL
SARS-COV-2 RNA RESP QL NAA+PROBE: NEGATIVE
SPECIMEN SOURCE: NORMAL

## 2021-04-15 ENCOUNTER — TELEPHONE (OUTPATIENT)
Dept: CARDIOLOGY | Facility: OTHER | Age: 86
End: 2021-04-15

## 2021-04-15 NOTE — TELEPHONE ENCOUNTER
[facility] would [facility] to know if we treat a specific cardio problem before doing her referral---  Please do we treat  progression of aortic regurgitation mitral regurgitation and pulmonary hypertension-Please call  and let her know-patient lives in [place] [facility]

## 2021-04-15 NOTE — TELEPHONE ENCOUNTER
Yes, those are things we see.     Dr. Blair     After verification, Maribel from [facility] contacted [facility] Dr Blair.  Jolie Ventura LPN ....................4/15/2021   9:42 AM

## 2021-05-05 ENCOUNTER — MEDICAL CORRESPONDENCE (OUTPATIENT)
Dept: HEALTH INFORMATION MANAGEMENT | Facility: OTHER | Age: 86
End: 2021-05-05

## 2021-05-13 ENCOUNTER — OFFICE VISIT (OUTPATIENT)
Dept: CARDIOLOGY | Facility: OTHER | Age: 86
End: 2021-05-13
Attending: INTERNAL MEDICINE
Payer: MEDICARE

## 2021-05-13 VITALS
HEIGHT: 69 IN | WEIGHT: 150 LBS | DIASTOLIC BLOOD PRESSURE: 70 MMHG | TEMPERATURE: 95.6 F | HEART RATE: 64 BPM | BODY MASS INDEX: 22.22 KG/M2 | SYSTOLIC BLOOD PRESSURE: 126 MMHG | RESPIRATION RATE: 18 BRPM | OXYGEN SATURATION: 98 %

## 2021-05-13 DIAGNOSIS — M62.81 GENERALIZED MUSCLE WEAKNESS: ICD-10-CM

## 2021-05-13 DIAGNOSIS — I35.1 MODERATE AORTIC INSUFFICIENCY: ICD-10-CM

## 2021-05-13 DIAGNOSIS — I71.21 ASCENDING AORTIC ANEURYSM (H): ICD-10-CM

## 2021-05-13 DIAGNOSIS — E03.9 HYPOTHYROIDISM, UNSPECIFIED TYPE: ICD-10-CM

## 2021-05-13 DIAGNOSIS — I50.42 CHRONIC COMBINED SYSTOLIC AND DIASTOLIC CONGESTIVE HEART FAILURE (H): ICD-10-CM

## 2021-05-13 DIAGNOSIS — Z87.891 HISTORY OF TOBACCO ABUSE: ICD-10-CM

## 2021-05-13 DIAGNOSIS — R79.89 ELEVATED TROPONIN: ICD-10-CM

## 2021-05-13 DIAGNOSIS — E78.2 MIXED HYPERLIPIDEMIA: ICD-10-CM

## 2021-05-13 DIAGNOSIS — I50.23 ACUTE ON CHRONIC SYSTOLIC CONGESTIVE HEART FAILURE (H): ICD-10-CM

## 2021-05-13 DIAGNOSIS — Z85.46 HISTORY OF PROSTATE CANCER: ICD-10-CM

## 2021-05-13 DIAGNOSIS — R73.9 HYPERGLYCEMIA: ICD-10-CM

## 2021-05-13 DIAGNOSIS — E03.9 ACQUIRED HYPOTHYROIDISM: ICD-10-CM

## 2021-05-13 DIAGNOSIS — R25.1 TREMOR: ICD-10-CM

## 2021-05-13 DIAGNOSIS — Z95.810 BIVENTRICULAR ICD (IMPLANTABLE CARDIOVERTER-DEFIBRILLATOR) IN PLACE: ICD-10-CM

## 2021-05-13 DIAGNOSIS — S22.000S COMPRESSION FRACTURE OF THORACIC VERTEBRA, UNSPECIFIED THORACIC VERTEBRAL LEVEL, SEQUELA: ICD-10-CM

## 2021-05-13 DIAGNOSIS — R91.8 PULMONARY NODULES: ICD-10-CM

## 2021-05-13 DIAGNOSIS — I38 VALVULAR HEART DISEASE: ICD-10-CM

## 2021-05-13 DIAGNOSIS — R06.09 DYSPNEA ON EXERTION: Primary | ICD-10-CM

## 2021-05-13 DIAGNOSIS — Z12.5 ENCOUNTER FOR SCREENING FOR MALIGNANT NEOPLASM OF PROSTATE: ICD-10-CM

## 2021-05-13 DIAGNOSIS — I50.22 CHRONIC SYSTOLIC CONGESTIVE HEART FAILURE (H): ICD-10-CM

## 2021-05-13 DIAGNOSIS — T82.110S FAILURE OF PACEMAKER LEAD, SEQUELA: ICD-10-CM

## 2021-05-13 LAB — INTERPRETATION ECG - MUSE: NORMAL

## 2021-05-13 PROCEDURE — G0463 HOSPITAL OUTPT CLINIC VISIT: HCPCS

## 2021-05-13 PROCEDURE — 93010 ELECTROCARDIOGRAM REPORT: CPT | Performed by: INTERNAL MEDICINE

## 2021-05-13 PROCEDURE — 99204 OFFICE O/P NEW MOD 45 MIN: CPT | Performed by: INTERNAL MEDICINE

## 2021-05-13 PROCEDURE — G0463 HOSPITAL OUTPT CLINIC VISIT: HCPCS | Mod: 25

## 2021-05-13 PROCEDURE — 93005 ELECTROCARDIOGRAM TRACING: CPT

## 2021-05-13 RX ORDER — FUROSEMIDE 20 MG
10 TABLET ORAL DAILY
Qty: 45 TABLET | Refills: 3 | Status: SHIPPED | OUTPATIENT
Start: 2021-05-13 | End: 2021-08-05

## 2021-05-13 RX ORDER — MULTIVIT-MIN/IRON/FOLIC ACID/K 18-600-40
CAPSULE ORAL
COMMUNITY

## 2021-05-13 RX ORDER — LANOLIN ALCOHOL/MO/W.PET/CERES
1 CREAM (GRAM) TOPICAL
COMMUNITY

## 2021-05-13 RX ORDER — LANOLIN ALCOHOL/MO/W.PET/CERES
CREAM (GRAM) TOPICAL DAILY
COMMUNITY

## 2021-05-13 ASSESSMENT — PAIN SCALES - GENERAL: PAINLEVEL: NO PAIN (0)

## 2021-05-13 ASSESSMENT — MIFFLIN-ST. JEOR: SCORE: 1355.78

## 2021-05-13 ASSESSMENT — PATIENT HEALTH QUESTIONNAIRE - PHQ9: SUM OF ALL RESPONSES TO PHQ QUESTIONS 1-9: 15

## 2021-05-13 NOTE — PATIENT INSTRUCTIONS
You were seen by  Federico Blair,       1. PFT's (Pulmonary function test) has been ordered. You will be called to schedule this.  You will receive instructions for testing at that time.  You will be contacted with results.     2. Echocardiogram has been ordered. You will be called to schedule this.  You will receive instructions for testing at that time.  You will be contacted with results.    3. Laboratory blood work has been ordered to be done in 1-2 weeks.  Please make a lab only appointment for this.  You will be notified by phone call or [facility] message when the results are available.    4. Start a low sodium diet. Keep salt intake to 2,500 mg per day or less.    5. Please weigh yourself every morning.  Keep a log of these weights.  Call if you gain more than 3 pounds in one day or 5 pounds in one week, have increased shortness of breath, or have increased swelling in your legs, feet, ankles, or belly.  [phone]    6. Keep fluid intake to 2 liters per day or less.     7.  Start the following:  furosemide (LASIX) 20 MG tablet        Sig - Route: Take 0.5 tablets (10 mg) by mouth daily as needed Use as needed daily for swelling for heart failure     8. Please schedule a cardiac device check (pacemaker).     You will follow up with [facility] in 3 months, sooner if needed.       Please call the cardiology office with problems, questions, or concerns at [phone].    If you experience chest pain, chest pressure, chest tightness, shortness of breath, fainting, lightheadedness, nausea, vomiting, or other concerning symptoms, please report to the Emergency Department or call [phone]. These symptoms may be emergent, and best treated in the Emergency Department.     Cardiology Nurses  LAYA Grady, LAYA SCHAFER, MARCELO WALTERS LPN  [facility] (Unit 3C)  [phone]

## 2021-05-13 NOTE — PROGRESS NOTES
[facility] HE[facility] CARE   CARDIOLOGY CONSULT     Tashi Santana   1935  [id number]    Vipin Iqbal     Chief Complaint   Patient presents with     Consult For     CHF          HPI:   Mr. Santana is an 86-year-old gentleman who is being seen by cardiology for heart failure. He has been euvolemic but dyspneic on exertion.  [facility] but secondary to age and distance, he has been granted care locally.    In his notes and [facility] the [facility], they report his EF was 25% dating back to August, 2011.  Most recently, his EF was 30%.  He has had multiple echoes throughout the year with variable EF's.  His highest EF was 35-40% in 2018.  He has not had a normalized EF.  He has been dyspneic on exertion.  He states this has been going on for many years.  He describes it rather stable but bothersome.  He is willing to do a work-up today as deemed fit.  He has not had significant peripheral edema or concerns for fluid overload.  He has been taking Lasix 10 mg as needed.  He had a BiV-ICD originally placed on 6/27/2010 with lead revision on 9/2010.  Placed for primary prevention.  NYHA classification 3.  He is also been on metoprolol 100 mg XL daily and lisinopril to 2.5 mg daily.  BNP of 787 on 2/19/2021.    He was recently hospitalized here at [facility] from 2/19/2021-2/20/2021 for acute on chronic systolic heart failure with improvement with mild diuresis.  Chest x-ray read as pneumonia but unlikely based on white count, lack of fever, negative procalcitonin, no clinical findings. Patient without obvious edema.  He was given IV Lasix and then continued on oral Lasix.    He also has a history of moderate aortic insufficiency noted most recently on his echo from 4/14/2021.  Patient is aware.  It was suggested he have a repeat echocardiogram in the few months to assess his heart function, ascending aorta, and valvular function.    Patient has a history of elevated troponin  without heart disease.  He says he has never a stent or bypass.  He is dyspneic on exertion and stress testing ordered on 5/13/2021.  Troponin peaked at 0.079 on 5/19/2018.    This was explained today that he has an ascending aortic aneurysm up to 4.3 cm on 4/14/2021.  He understands he needs regular echocardiogram/imaging related to this finding.    He has history of prostate cancer which he states he was treated for.  PSA ordered on 5/13/2021.    IMAGING RESULTS:   [facility]:  Left ventricle mildly to moderately dilated.  EF 30%.  Mild mitral regurgitation.  Ascending aorta at 4.3 cm.  Right ventricle mildly enlarged.  Right ventricle systolic function mildly reduced.  Left atrium moderately dilated.  Mild to moderate aortic regurgitation.    ECHO 3/23/18:  Mild left ventricular dilation is present.  Moderately (EF 35-40%) reduced left ventricular function is present.  Inferolateral akinesis  Moderate aortic insufficiency is present.  Moderate dilatation of the aorta is present.  Ascending aorta 4.2 cm.  Moderate aortic insufficiency is present.     CURRENT MEDICATIONS:   Prior to Admission medications    Medication Sig Start Date End Date Taking? Authorizing Provider   amitriptyline (ELAVIL) 10 MG tablet Take 20 mg by mouth At Bedtime    Unknown, Entered By History   brimonidine (ALPHAGAN) 0.2 % ophthalmic solution PLACE 1 DROP INTO THE LEFT EYE TWO TIMES A DAY 5/23/18   Reported, Patient   calcitonin, salmon, (MIACALCIN) 200 UNIT/ACT nasal spray Spray 1 spray into one nostril alternating nostrils daily Alternate nostril each day.    Unknown, Entered By History   dorzolamide-timolol (COSOPT) 2-0.5 % ophthalmic solution PLACE 1 DROP INTO THE LEFT EYE TWO TIMES DAILY 5/23/18   Reported, Patient   folic acid (FOLVITE) 1 MG tablet Take 1 mg by mouth daily    Unknown, Entered By History   furosemide (LASIX) 20 MG tablet Take 0.5 tablets (10 mg) by mouth daily as needed Use as needed daily for  swelling for heart failure 2/20/21   Jackson Hartmann MD   lamoTRIgine (LAMICTAL) 200 MG tablet Take 200 mg by mouth 2 times daily.    Reported, Patient   levothyroxine (SSYNTHROID,LEVOTHROID) 100 MCG tablet Take 100 mcg by mouth daily.    Reported, Patient   lisinopril (ZESTRIL) 2.5 MG tablet Take 1 tablet (2.5 mg) by mouth daily 2/21/21   Jackson Hartmann MD   metoprolol succinate ER (TOPROL-XL) 100 MG 24 hr tablet Take 50 mg by mouth At Bedtime     Reported, Patient   risperiDONE (RISPERDAL) 4 MG tablet Take 2 mg by mouth At Bedtime     Reported, Patient   simvastatin (ZOCOR) 40 MG tablet Take 20 mg by mouth At Bedtime     Reported, Patient       ALLERGIES:   No Known Allergies     PAST MEDICAL HISTORY:   Past Medical History:   Diagnosis Date     Acquired hypothyroidism     hx of neck radiation 2017     Depression      Systolic congestive heart failure (H) 2012        PAST SURGICAL HISTORY:   Past Surgical History:   Procedure Laterality Date     CA ANESTH,PACEMAKER INSERTION          FAMILY HISTORY:   Family History   Problem Relation Age of Onset     Lung Cancer Mother      Alzheimer Disease Father         SOCIAL HISTORY:   Social History     Socioeconomic History     Marital status:      Spouse name: Not on file     Number of children: Not on file     Years of education: Not on file     Highest education level: Not on file   Occupational History     Not on file   Social Needs     Financial resource strain: Not on file     Food insecurity     Worry: Not on file     Inability: Not on file     Transportation needs     Medical: Not on file     Non-medical: Not on file   Tobacco Use     Smoking status: Former Smoker     Smokeless tobacco: Never Used   Substance and Sexual Activity     Alcohol use: No     Drug use: Unknown     Types: Other     Comment: Drug use: No     Sexual activity: Not on file   Lifestyle     Physical activity     Days per week: Not on file     Minutes per session: Not on file      Stress: Not on file   Relationships     Social connections     Talks on phone: Not on file     Gets together: Not on file     Attends Baptism service: Not on file     Active member of club or organization: Not on file     Attends meetings of clubs or organizations: Not on file     Relationship status: Not on file     Intimate partner violence     Fear of current or ex partner: Not on file     Emotionally abused: Not on file     Physically abused: Not on file     Forced sexual activity: Not on file   Other Topics Concern     Not on file   Social History Narrative    Preload 6/24/2013          ROS:   CONSTITUTIONAL: No weight loss, fever, chills, but admits to weakness or fatigue.   HEENT: Eyes: No visual changes. Ears, Nose, Throat: No hearing loss, congestion or difficulty swallowing.   CARDIOVASCULAR: No chest pain, chest pressure or chest discomfort. No palpitations but has lower extremity edema.   RESPIRATORY: (+) shortness of breath with dyspnea upon exertion, no cough or sputum production.   GASTROINTESTINAL: No abdominal pain. No anorexia, nausea, vomiting or diarrhea.   NEUROLOGICAL: No headache, lightheadedness, dizziness, syncope, ataxia but admits to generalized weakness.   HEMATOLOGIC: No anemia, bleeding or bruising.   PSYCHIATRIC: No history of depression or anxiety.   ENDOCRINOLOGIC: No reports of sweating, cold or heat intolerance. No polyuria or polydipsia.   SKIN: No abnormal rashes or itching.       PHYSICAL EXAM:   GENERAL: The patient is a well-developed, well-nourished, in no apparent distress. Alert and oriented x3.   HEENT: Head is normocephalic and atraumatic. Eyes are symmetrical with normal visual tracking.  HEART: Regular rate and rhythm, S1S2 present with a murmur, but no rub or gallop.   LUNGS: Respirations regular and unlabored. Clear to auscultation.   EXTREMITIES: He has peripheral edema present.   NEUROLOGIC: Alert and oriented X3.    SKIN: No jaundice. No rashes or visible  skin lesions present.        LAB RESULTS:   [facility]/Nurse Visit on 03/26/2021   Component Date Value Ref Range Status     COVID-19 Virus PCR to [facility] - So* 03/26/2021 Nasopharyngeal   Final     COVID-19 Virus PCR to [facility] - Re* 03/26/2021 Test received-See reflex to [facility] test SARS CoV2 (COVID-19) Virus RT-PCR   Final     SARS-CoV-2 Virus Specimen Source 03/26/2021 Nasopharyngeal   Final     SARS-CoV-2 PCR Result 03/26/2021 NEGATIVE   Final     SARS-CoV-2 PCR Comment 03/26/2021    Final                    Value:Testing was performed using the Aptima SARS-CoV-2 Assay on the [employer] System.   Additional information about this Emergency Use Authorization (EUA) assay can be found via   the Lab Guide.            ASSESSMENT:       ICD-10-CM    1. Dyspnea on exertion  R06.00 Pulmonary Function Test     Echocardiogram Complete     furosemide (LASIX) 20 MG tablet     CBC with platelets     Comprehensive metabolic panel     Thyrotropin GH     N terminal pro BNP outpatient     Magnesium     T4 free   2. Acute on chronic systolic congestive heart failure (H)  I50.23 NM Lexiscan stress test     Echocardiogram Complete     furosemide (LASIX) 20 MG tablet     N terminal pro BNP outpatient   3. Chronic systolic congestive heart failure (H)  I50.22 EKG 12-lead, tracing only     NM Lexiscan stress test     Echocardiogram Complete     Cardiac Device Check - In Clinic     furosemide (LASIX) 20 MG tablet     N terminal pro BNP outpatient   4. Chronic combined systolic and diastolic congestive heart failure (H)  I50.42 NM Lexiscan stress test     Echocardiogram Complete     furosemide (LASIX) 20 MG tablet     N terminal pro BNP outpatient   5. Moderate aortic insufficiency  I35.1 Echocardiogram Complete   6. Valvular heart disease  I38 Echocardiogram Complete   7. Biventricular ICD (implantable cardioverter-defibrillator) in place on 6/27/2010  Z95.810 Cardiac Device Check - In Clinic   8. 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 Generalized muscle weakness  M62.81    10. Elevated troponin 0.079 5/19/2018  R77.8 NM Lexiscan stress test   11. Compression fracture of thoracic vertebra on 2/19/2021  S22.000S    12. Pulmonary nodules  R91.8    13. Mixed hyperlipidemia  E78.2 Lipid Profile   14. History of prostate cancer  Z85.46 Prostate spec antigen screen   15. Ascending aortic aneurysm (H)  I71.2 Echocardiogram Complete   16. History of tobacco abuse  Z87.891 Pulmonary Function Test   17. Hyperglycemia  R73.9 Hemoglobin A1c     T4 free   18. Hypothyroidism, unspecified type  E03.9 T4 free   19. Encounter for screening for malignant neoplasm of prostate   Z12.5 Prostate spec antigen screen   20. Acquired hypothyroidism  E03.9    21. Tremor  R25.1          PLAN:   1.  He is euvolemic on physical exam but does have a chest x-ray and concerns for mild pulmonary congestion.  He has a history of a severely reduced EF, most recently of 30%.  He has been taking Lasix 10 mg as needed.  He we will start taking Lasix 10 mg daily.  2.  Salt restriction, fluid restriction, and daily weights.  This was discussed heavily.  3.  He has a history of a BiV-ICD, he will be set up to have this checked in the 3 months.  He states he has approximately 9 months left on his battery.  4.  EKG today shows a paced rhythm.  5.  Secondary dyspnea on exertion, he will be set up for stress test.  6.  Secondary to history of shortness of breath and history of tobacco abuse, will be set up for PFT's.  6.  He will have an echocardiogram in 3 months to assess his EF, ascending aorta, and severity of aortic insufficiency.  7.  Labs in 1 to 2 weeks.  Labs will include PSA, T4, magnesium, A1c, lipid, BMP, CMP, and CBC.  8.  Follow-up in 3 months or sooner with issues.        Thank you for allowing me to participate in the care of your patient. Please do not hesitate to contact me if you have any questions.      Federico Blair, DO

## 2021-05-21 DIAGNOSIS — F32.A DEPRESSION, UNSPECIFIED DEPRESSION TYPE: Primary | ICD-10-CM

## 2021-05-21 DIAGNOSIS — E56.9 DEFICIENCY OF MULTIPLE VITAMINS: ICD-10-CM

## 2021-05-25 DIAGNOSIS — R06.09 DYSPNEA ON EXERTION: ICD-10-CM

## 2021-05-25 DIAGNOSIS — I50.22 CHRONIC SYSTOLIC CONGESTIVE HEART FAILURE (H): ICD-10-CM

## 2021-05-25 DIAGNOSIS — F32.A DEPRESSION, UNSPECIFIED DEPRESSION TYPE: ICD-10-CM

## 2021-05-25 DIAGNOSIS — I50.23 ACUTE ON CHRONIC SYSTOLIC CONGESTIVE HEART FAILURE (H): ICD-10-CM

## 2021-05-25 DIAGNOSIS — Z85.46 HISTORY OF PROSTATE CANCER: ICD-10-CM

## 2021-05-25 DIAGNOSIS — Z12.5 ENCOUNTER FOR SCREENING FOR MALIGNANT NEOPLASM OF PROSTATE: ICD-10-CM

## 2021-05-25 DIAGNOSIS — R73.9 HYPERGLYCEMIA: ICD-10-CM

## 2021-05-25 DIAGNOSIS — E03.9 HYPOTHYROIDISM, UNSPECIFIED TYPE: ICD-10-CM

## 2021-05-25 DIAGNOSIS — E78.2 MIXED HYPERLIPIDEMIA: ICD-10-CM

## 2021-05-25 DIAGNOSIS — E56.9 DEFICIENCY OF MULTIPLE VITAMINS: ICD-10-CM

## 2021-05-25 DIAGNOSIS — I50.42 CHRONIC COMBINED SYSTOLIC AND DIASTOLIC CONGESTIVE HEART FAILURE (H): ICD-10-CM

## 2021-05-25 LAB
ALBUMIN SERPL-MCNC: 4.2 G/DL (ref 3.5–5.7)
ALP SERPL-CCNC: 77 U/L (ref 34–104)
ALT SERPL W P-5'-P-CCNC: 18 U/L (ref 7–52)
ANION GAP SERPL CALCULATED.3IONS-SCNC: 10 MMOL/L (ref 3–14)
AST SERPL W P-5'-P-CCNC: 24 U/L (ref 13–39)
BILIRUB SERPL-MCNC: 1.1 MG/DL (ref 0.3–1)
BUN SERPL-MCNC: 16 MG/DL (ref 7–25)
CALCIUM SERPL-MCNC: 9.7 MG/DL (ref 8.6–10.3)
CHLORIDE SERPL-SCNC: 101 MMOL/L (ref 98–107)
CHOLEST SERPL-MCNC: 117 MG/DL
CO2 SERPL-SCNC: 24 MMOL/L (ref 21–31)
CREAT SERPL-MCNC: 0.87 MG/DL (ref 0.7–1.3)
ERYTHROCYTE [DISTWIDTH] IN BLOOD BY AUTOMATED COUNT: 14 % (ref 10–15)
FOLATE SERPL-MCNC: >24.8 NG/ML
GFR SERPL CREATININE-BSD FRML MDRD: 83 ML/MIN/{1.73_M2}
GLUCOSE SERPL-MCNC: 112 MG/DL (ref 70–105)
HBA1C MFR BLD: 5.2 % (ref 4–6)
HCT VFR BLD AUTO: 40.2 % (ref 40–53)
HDLC SERPL-MCNC: 48 MG/DL (ref 23–92)
HGB BLD-MCNC: 13.7 G/DL (ref 13.3–17.7)
LDLC SERPL CALC-MCNC: 55 MG/DL
MAGNESIUM SERPL-MCNC: 2 MG/DL (ref 1.9–2.7)
MCH RBC QN AUTO: 30.9 PG (ref 26.5–33)
MCHC RBC AUTO-ENTMCNC: 34.1 G/DL (ref 31.5–36.5)
MCV RBC AUTO: 91 FL (ref 78–100)
NONHDLC SERPL-MCNC: 69 MG/DL
NT-PROBNP SERPL-MCNC: 541 PG/ML (ref 0–100)
PLATELET # BLD AUTO: 172 10E9/L (ref 150–450)
POTASSIUM SERPL-SCNC: 4.2 MMOL/L (ref 3.5–5.1)
PROT SERPL-MCNC: 7.5 G/DL (ref 6.4–8.9)
PSA SERPL-MCNC: 0.04 NG/ML
RBC # BLD AUTO: 4.44 10E12/L (ref 4.4–5.9)
SODIUM SERPL-SCNC: 135 MMOL/L (ref 134–144)
T4 FREE SERPL-MCNC: 1.33 NG/DL (ref 0.6–1.6)
TRIGL SERPL-MCNC: 72 MG/DL
TSH SERPL DL<=0.05 MIU/L-ACNC: 1.11 IU/ML (ref 0.34–5.6)
VIT B12 SERPL-MCNC: >1500 PG/ML (ref 180–914)
WBC # BLD AUTO: 7.2 10E9/L (ref 4–11)

## 2021-05-25 PROCEDURE — 83735 ASSAY OF MAGNESIUM: CPT | Mod: ZL

## 2021-05-25 PROCEDURE — 85027 COMPLETE CBC AUTOMATED: CPT | Mod: ZL

## 2021-05-25 PROCEDURE — 84443 ASSAY THYROID STIM HORMONE: CPT | Mod: ZL

## 2021-05-25 PROCEDURE — 84439 ASSAY OF FREE THYROXINE: CPT | Mod: ZL

## 2021-05-25 PROCEDURE — 36415 COLL VENOUS BLD VENIPUNCTURE: CPT | Mod: ZL

## 2021-05-25 PROCEDURE — 80061 LIPID PANEL: CPT | Mod: ZL

## 2021-05-25 PROCEDURE — 82607 VITAMIN B-12: CPT | Mod: ZL

## 2021-05-25 PROCEDURE — 83036 HEMOGLOBIN GLYCOSYLATED A1C: CPT | Mod: ZL

## 2021-05-25 PROCEDURE — 82746 ASSAY OF FOLIC ACID SERUM: CPT | Mod: ZL

## 2021-05-25 PROCEDURE — 83921 ORGANIC ACID SINGLE QUANT: CPT | Mod: ZL

## 2021-05-25 PROCEDURE — 84153 ASSAY OF PSA TOTAL: CPT | Mod: ZL

## 2021-05-25 PROCEDURE — 83880 ASSAY OF NATRIURETIC PEPTIDE: CPT | Mod: ZL

## 2021-05-25 PROCEDURE — 80053 COMPREHEN METABOLIC PANEL: CPT | Mod: ZL

## 2021-06-03 LAB — METHYLMALONATE SERPL-SCNC: 0.17 UMOL/L (ref 0–0.4)

## 2021-06-15 ENCOUNTER — TRANSFERRED RECORDS (OUTPATIENT)
Dept: HEALTH INFORMATION MANAGEMENT | Facility: OTHER | Age: 86
End: 2021-06-15

## 2021-07-09 ENCOUNTER — TELEPHONE (OUTPATIENT)
Dept: CARDIOLOGY | Facility: OTHER | Age: 86
End: 2021-07-09

## 2021-07-09 NOTE — TELEPHONE ENCOUNTER
Patient verified .  Reminder call for stress test with instructions given. Emphasis on NO caffeine for 12 hours.  To take all medications as usual in am with water.  No food for 4 hours before the test.   Patient verbalized understanding.

## 2021-07-13 ENCOUNTER — HOSPITAL ENCOUNTER (OUTPATIENT)
Dept: NUCLEAR MEDICINE | Facility: OTHER | Age: 86
End: 2021-07-13
Attending: INTERNAL MEDICINE
Payer: COMMERCIAL

## 2021-07-13 ENCOUNTER — HOSPITAL ENCOUNTER (OUTPATIENT)
Dept: CARDIOLOGY | Facility: OTHER | Age: 86
End: 2021-07-13
Attending: INTERNAL MEDICINE
Payer: COMMERCIAL

## 2021-07-13 VITALS — BODY MASS INDEX: 22.96 KG/M2 | HEIGHT: 69 IN | WEIGHT: 155 LBS

## 2021-07-13 DIAGNOSIS — R79.89 ELEVATED TROPONIN: ICD-10-CM

## 2021-07-13 DIAGNOSIS — I71.21 ASCENDING AORTIC ANEURYSM (H): ICD-10-CM

## 2021-07-13 DIAGNOSIS — I50.22 CHRONIC SYSTOLIC CONGESTIVE HEART FAILURE (H): ICD-10-CM

## 2021-07-13 DIAGNOSIS — I50.42 CHRONIC COMBINED SYSTOLIC AND DIASTOLIC CONGESTIVE HEART FAILURE (H): ICD-10-CM

## 2021-07-13 DIAGNOSIS — I50.23 ACUTE ON CHRONIC SYSTOLIC CONGESTIVE HEART FAILURE (H): ICD-10-CM

## 2021-07-13 DIAGNOSIS — R06.09 DYSPNEA ON EXERTION: ICD-10-CM

## 2021-07-13 DIAGNOSIS — I38 VALVULAR HEART DISEASE: ICD-10-CM

## 2021-07-13 DIAGNOSIS — I35.1 MODERATE AORTIC INSUFFICIENCY: ICD-10-CM

## 2021-07-13 LAB
CV BLOOD PRESSURE: 29 MMHG
CV STRESS MAX HR HE: 93
LVEF ECHO: NORMAL
NUC STRESS EJECTION FRACTION: 36 %
RATE PRESSURE PRODUCT: NORMAL
STRESS ECHO BASELINE DIASTOLIC HE: 76
STRESS ECHO BASELINE HR: 73
STRESS ECHO BASELINE SYSTOLIC BP: 128
STRESS ECHO CALCULATED PERCENT HR: 69 %
STRESS ECHO LAST STRESS DIASTOLIC BP: 75
STRESS ECHO LAST STRESS SYSTOLIC BP: 132
STRESS ECHO POST ESTIMATED WORKLOAD: 1 METS
STRESS ECHO TARGET HR: 135

## 2021-07-13 PROCEDURE — 250N000011 HC RX IP 250 OP 636: Performed by: INTERNAL MEDICINE

## 2021-07-13 PROCEDURE — 343N000001 HC RX 343: Performed by: INTERNAL MEDICINE

## 2021-07-13 PROCEDURE — A9500 TC99M SESTAMIBI: HCPCS | Performed by: INTERNAL MEDICINE

## 2021-07-13 PROCEDURE — 93018 CV STRESS TEST I&R ONLY: CPT | Performed by: INTERNAL MEDICINE

## 2021-07-13 PROCEDURE — 255N000002 HC RX 255 OP 636: Performed by: INTERNAL MEDICINE

## 2021-07-13 PROCEDURE — 93016 CV STRESS TEST SUPVJ ONLY: CPT | Performed by: INTERNAL MEDICINE

## 2021-07-13 PROCEDURE — 78452 HT MUSCLE IMAGE SPECT MULT: CPT

## 2021-07-13 PROCEDURE — 93017 CV STRESS TEST TRACING ONLY: CPT

## 2021-07-13 PROCEDURE — 93306 TTE W/DOPPLER COMPLETE: CPT | Mod: 26 | Performed by: STUDENT IN AN ORGANIZED HEALTH CARE EDUCATION/TRAINING PROGRAM

## 2021-07-13 RX ORDER — REGADENOSON 0.08 MG/ML
0.4 INJECTION, SOLUTION INTRAVENOUS ONCE
Status: COMPLETED | OUTPATIENT
Start: 2021-07-13 | End: 2021-07-13

## 2021-07-13 RX ADMIN — REGADENOSON 0.4 MG: 0.08 INJECTION, SOLUTION INTRAVENOUS at 09:40

## 2021-07-13 RX ADMIN — PERFLUTREN 2 ML: 6.52 INJECTION, SUSPENSION INTRAVENOUS at 08:45

## 2021-07-13 RX ADMIN — KIT FOR THE PREPARATION OF TECHNETIUM TC99M SESTAMIBI 8.36 MILLICURIE: 1 INJECTION, POWDER, LYOPHILIZED, FOR SOLUTION PARENTERAL at 08:05

## 2021-07-13 RX ADMIN — KIT FOR THE PREPARATION OF TECHNETIUM TC99M SESTAMIBI 32.3 MILLICURIE: 1 INJECTION, POWDER, LYOPHILIZED, FOR SOLUTION PARENTERAL at 09:45

## 2021-07-13 ASSESSMENT — MIFFLIN-ST. JEOR: SCORE: 1378.46

## 2021-07-13 NOTE — PROGRESS NOTES
0800The patient arrived for a Lexiscan Cardiolite stress test.  The procedure, risks, and benefits were discussed with the patient ,and the consent was signed.  A saline lock was started,and the Cardiolite was injected by Nuclear Medicine.  The patient was taken to the waiting area, to await resting images at 0815.  0935The patient returned from Nuclear Medicine and was prepped for the stress test.   Dr. Banerjee  arrived, and the patient was administered the Lexiscan per procedure.  The patient tolerated the procedure.  He was given a snack and taken to Nuclear Medicine in stable condition for stress images.  The saline lock will be removed by Nuclear Medicine for proper disposal.  The patient was instructed that the ordering MD will call with results in one to two days.  Please see the chart for the complete test results.

## 2021-07-15 ENCOUNTER — TELEPHONE (OUTPATIENT)
Dept: CARDIOLOGY | Facility: OTHER | Age: 86
End: 2021-07-15

## 2021-07-15 NOTE — TELEPHONE ENCOUNTER
Per Federico Blair DO patient needs a cardiac catheterization at the [facility] and patient was going to [facility] with [facility] about [facility].  I called patient and he said the [facility] said it would be covered.  Patient set up for an angiogram at the [facility] on 8/3/21 with a 10:30 AM check in.  Angiogram teaching scheduled for 7/19/21 at 1:30PM.  Covid test scheduled for 7/30/21. Follow up appointment already scheduled with Federico Blair DO on 8/5/21.  Ruma Ortiz RN......July 15, 2021...4:32 PM

## 2021-07-19 ENCOUNTER — ALLIED HEALTH/NURSE VISIT (OUTPATIENT)
Dept: CARDIOLOGY | Facility: OTHER | Age: 86
End: 2021-07-19
Attending: FAMILY MEDICINE
Payer: MEDICARE

## 2021-07-19 VITALS
BODY MASS INDEX: 21.18 KG/M2 | DIASTOLIC BLOOD PRESSURE: 70 MMHG | TEMPERATURE: 96.8 F | OXYGEN SATURATION: 98 % | WEIGHT: 143.4 LBS | HEART RATE: 75 BPM | RESPIRATION RATE: 20 BRPM | SYSTOLIC BLOOD PRESSURE: 116 MMHG

## 2021-07-19 DIAGNOSIS — I50.23 ACUTE ON CHRONIC SYSTOLIC CONGESTIVE HEART FAILURE (H): Primary | ICD-10-CM

## 2021-07-19 PROCEDURE — G0463 HOSPITAL OUTPT CLINIC VISIT: HCPCS

## 2021-07-19 ASSESSMENT — PAIN SCALES - GENERAL: PAINLEVEL: NO PAIN (0)

## 2021-07-19 NOTE — NURSING NOTE
"Angiogram teaching done using the \"Coronary Angiogram, Angioplasty and Stent Placement\" patient guide provided by the [facility].  All questions answered.  Per KAYLA Malin CNP patient should start aspirin 81mg once daily and take even on the morning of the angiogram.  He will keep taking this until addressed at follow up appointment or if discontinued by a provider sooner.  He should also hold his vitamin D, vitamin B12, folic acid and calcitonin spray starting the day prior to the procedure.  Patient verbalized understanding of above information.  Ruma Ortiz RN......July 19, 2021...2:29 PM   "

## 2021-07-26 DIAGNOSIS — R94.39 ABNORMAL CARDIOVASCULAR STRESS TEST: ICD-10-CM

## 2021-07-26 DIAGNOSIS — R79.89 TROPONIN LEVEL ELEVATED: ICD-10-CM

## 2021-07-26 DIAGNOSIS — R06.09 DYSPNEA ON EXERTION: Primary | ICD-10-CM

## 2021-07-28 PROBLEM — R94.39 ABNORMAL CARDIOVASCULAR STRESS TEST: Status: ACTIVE | Noted: 2021-07-28

## 2021-07-30 ENCOUNTER — ALLIED HEALTH/NURSE VISIT (OUTPATIENT)
Dept: FAMILY MEDICINE | Facility: OTHER | Age: 86
End: 2021-07-30
Payer: COMMERCIAL

## 2021-07-30 DIAGNOSIS — Z20.822 COVID-19 RULED OUT: Primary | ICD-10-CM

## 2021-07-30 LAB — SARS-COV-2 RNA RESP QL NAA+PROBE: NEGATIVE

## 2021-07-30 PROCEDURE — U0003 INFECTIOUS AGENT DETECTION BY NUCLEIC ACID (DNA OR RNA); SEVERE ACUTE RESPIRATORY SYNDROME CORONAVIRUS 2 (SARS-COV-2) (CORONAVIRUS DISEASE [COVID-19]), AMPLIFIED PROBE TECHNIQUE, MAKING USE OF HIGH THROUGHPUT TECHNOLOGIES AS DESCRIBED BY CMS-2020-01-R: HCPCS | Mod: ZL

## 2021-07-30 PROCEDURE — C9803 HOPD COVID-19 SPEC COLLECT: HCPCS

## 2021-07-30 NOTE — PROGRESS NOTES
Patient swabbed for COVID-19 testing.  Mary Ann Landon LPN on 7/30/2021 at 12:10 PM    Surgery on 8-3-21 at  of .

## 2021-08-02 ENCOUNTER — TELEPHONE (OUTPATIENT)
Dept: CARDIOLOGY | Facility: CLINIC | Age: 86
End: 2021-08-02

## 2021-08-03 ENCOUNTER — APPOINTMENT (OUTPATIENT)
Dept: LAB | Facility: CLINIC | Age: 86
End: 2021-08-03
Attending: INTERNAL MEDICINE
Payer: COMMERCIAL

## 2021-08-03 ENCOUNTER — HOSPITAL ENCOUNTER (OUTPATIENT)
Facility: CLINIC | Age: 86
Discharge: HOME | End: 2021-08-03
Attending: INTERNAL MEDICINE | Admitting: INTERNAL MEDICINE
Payer: COMMERCIAL

## 2021-08-03 ENCOUNTER — APPOINTMENT (OUTPATIENT)
Dept: MEDSURG UNIT | Facility: CLINIC | Age: 86
End: 2021-08-03
Attending: INTERNAL MEDICINE
Payer: COMMERCIAL

## 2021-08-03 VITALS
BODY MASS INDEX: 19.76 KG/M2 | OXYGEN SATURATION: 94 % | SYSTOLIC BLOOD PRESSURE: 95 MMHG | HEIGHT: 70 IN | TEMPERATURE: 98 F | WEIGHT: 138 LBS | DIASTOLIC BLOOD PRESSURE: 58 MMHG | RESPIRATION RATE: 18 BRPM | HEART RATE: 65 BPM

## 2021-08-03 DIAGNOSIS — R94.39 ABNORMAL CARDIOVASCULAR STRESS TEST: ICD-10-CM

## 2021-08-03 DIAGNOSIS — R79.89 TROPONIN LEVEL ELEVATED: ICD-10-CM

## 2021-08-03 DIAGNOSIS — R06.09 DYSPNEA ON EXERTION: ICD-10-CM

## 2021-08-03 PROBLEM — Z98.890 STATUS POST CORONARY ANGIOGRAM: Status: ACTIVE | Noted: 2021-08-03

## 2021-08-03 LAB
ACT BLD: 203 SECONDS (ref 74–150)
ANION GAP SERPL CALCULATED.3IONS-SCNC: 2 MMOL/L (ref 3–14)
BUN SERPL-MCNC: 19 MG/DL (ref 7–30)
CALCIUM SERPL-MCNC: 9.1 MG/DL (ref 8.5–10.1)
CHLORIDE BLD-SCNC: 107 MMOL/L (ref 94–109)
CO2 SERPL-SCNC: 26 MMOL/L (ref 20–32)
CREAT SERPL-MCNC: 0.96 MG/DL (ref 0.66–1.25)
ERYTHROCYTE [DISTWIDTH] IN BLOOD BY AUTOMATED COUNT: 13.6 % (ref 10–15)
GFR SERPL CREATININE-BSD FRML MDRD: 72 ML/MIN/1.73M2
GLUCOSE BLD-MCNC: 109 MG/DL (ref 70–99)
HCT VFR BLD AUTO: 39.2 % (ref 40–53)
HGB BLD-MCNC: 13 G/DL (ref 13.3–17.7)
MCH RBC QN AUTO: 30.4 PG (ref 26.5–33)
MCHC RBC AUTO-ENTMCNC: 33.2 G/DL (ref 31.5–36.5)
MCV RBC AUTO: 92 FL (ref 78–100)
PLATELET # BLD AUTO: 167 10E3/UL (ref 150–450)
POTASSIUM BLD-SCNC: 4.2 MMOL/L (ref 3.4–5.3)
RBC # BLD AUTO: 4.28 10E6/UL (ref 4.4–5.9)
SODIUM SERPL-SCNC: 135 MMOL/L (ref 133–144)
WBC # BLD AUTO: 6.7 10E3/UL (ref 4–11)

## 2021-08-03 PROCEDURE — 999N000054 HC STATISTIC EKG NON-CHARGEABLE

## 2021-08-03 PROCEDURE — 85027 COMPLETE CBC AUTOMATED: CPT | Performed by: NURSE PRACTITIONER

## 2021-08-03 PROCEDURE — 36415 COLL VENOUS BLD VENIPUNCTURE: CPT | Performed by: NURSE PRACTITIONER

## 2021-08-03 PROCEDURE — 93454 CORONARY ARTERY ANGIO S&I: CPT | Performed by: INTERNAL MEDICINE

## 2021-08-03 PROCEDURE — 93571 IV DOP VEL&/PRESS C FLO 1ST: CPT | Mod: 52,RC | Performed by: INTERNAL MEDICINE

## 2021-08-03 PROCEDURE — 272N000001 HC OR GENERAL SUPPLY STERILE: Performed by: INTERNAL MEDICINE

## 2021-08-03 PROCEDURE — C1887 CATHETER, GUIDING: HCPCS | Performed by: INTERNAL MEDICINE

## 2021-08-03 PROCEDURE — 93010 ELECTROCARDIOGRAM REPORT: CPT | Mod: 59 | Performed by: INTERNAL MEDICINE

## 2021-08-03 PROCEDURE — 272N000002 HC OR SUPPLY OTHER OPNP: Performed by: INTERNAL MEDICINE

## 2021-08-03 PROCEDURE — 258N000003 HC RX IP 258 OP 636: Performed by: NURSE PRACTITIONER

## 2021-08-03 PROCEDURE — 85347 COAGULATION TIME ACTIVATED: CPT

## 2021-08-03 PROCEDURE — 999N000142 HC STATISTIC PROCEDURE PREP ONLY

## 2021-08-03 PROCEDURE — 250N000009 HC RX 250: Performed by: NURSE PRACTITIONER

## 2021-08-03 PROCEDURE — 80048 BASIC METABOLIC PNL TOTAL CA: CPT | Performed by: NURSE PRACTITIONER

## 2021-08-03 PROCEDURE — 99152 MOD SED SAME PHYS/QHP 5/>YRS: CPT | Performed by: INTERNAL MEDICINE

## 2021-08-03 PROCEDURE — C1894 INTRO/SHEATH, NON-LASER: HCPCS | Performed by: INTERNAL MEDICINE

## 2021-08-03 PROCEDURE — 99153 MOD SED SAME PHYS/QHP EA: CPT | Performed by: INTERNAL MEDICINE

## 2021-08-03 PROCEDURE — 250N000011 HC RX IP 250 OP 636: Performed by: INTERNAL MEDICINE

## 2021-08-03 PROCEDURE — 99214 OFFICE O/P EST MOD 30 MIN: CPT | Mod: 25 | Performed by: NURSE PRACTITIONER

## 2021-08-03 PROCEDURE — 250N000013 HC RX MED GY IP 250 OP 250 PS 637: Performed by: NURSE PRACTITIONER

## 2021-08-03 PROCEDURE — 250N000009 HC RX 250: Performed by: INTERNAL MEDICINE

## 2021-08-03 PROCEDURE — C1769 GUIDE WIRE: HCPCS | Performed by: INTERNAL MEDICINE

## 2021-08-03 RX ORDER — LIDOCAINE 40 MG/G
CREAM TOPICAL
Status: DISCONTINUED | OUTPATIENT
Start: 2021-08-03 | End: 2021-08-03 | Stop reason: HOSPADM

## 2021-08-03 RX ORDER — ASPIRIN 325 MG
325 TABLET ORAL ONCE
Status: COMPLETED | OUTPATIENT
Start: 2021-08-03 | End: 2021-08-03

## 2021-08-03 RX ORDER — DOBUTAMINE HYDROCHLORIDE 200 MG/100ML
2-20 INJECTION INTRAVENOUS CONTINUOUS PRN
Status: DISCONTINUED | OUTPATIENT
Start: 2021-08-03 | End: 2021-08-03 | Stop reason: HOSPADM

## 2021-08-03 RX ORDER — FENTANYL CITRATE 50 UG/ML
25 INJECTION, SOLUTION INTRAMUSCULAR; INTRAVENOUS
Status: DISCONTINUED | OUTPATIENT
Start: 2021-08-03 | End: 2021-08-03 | Stop reason: HOSPADM

## 2021-08-03 RX ORDER — IOPAMIDOL 755 MG/ML
INJECTION, SOLUTION INTRAVASCULAR
Status: DISCONTINUED | OUTPATIENT
Start: 2021-08-03 | End: 2021-08-03 | Stop reason: HOSPADM

## 2021-08-03 RX ORDER — NALOXONE HYDROCHLORIDE 0.4 MG/ML
0.4 INJECTION, SOLUTION INTRAMUSCULAR; INTRAVENOUS; SUBCUTANEOUS
Status: DISCONTINUED | OUTPATIENT
Start: 2021-08-03 | End: 2021-08-03 | Stop reason: HOSPADM

## 2021-08-03 RX ORDER — NITROGLYCERIN 5 MG/ML
VIAL (ML) INTRAVENOUS
Status: DISCONTINUED | OUTPATIENT
Start: 2021-08-03 | End: 2021-08-03 | Stop reason: HOSPADM

## 2021-08-03 RX ORDER — NICARDIPINE HYDROCHLORIDE 2.5 MG/ML
INJECTION INTRAVENOUS
Status: DISCONTINUED | OUTPATIENT
Start: 2021-08-03 | End: 2021-08-03 | Stop reason: HOSPADM

## 2021-08-03 RX ORDER — ONDANSETRON 2 MG/ML
INJECTION INTRAMUSCULAR; INTRAVENOUS
Status: DISCONTINUED | OUTPATIENT
Start: 2021-08-03 | End: 2021-08-03 | Stop reason: HOSPADM

## 2021-08-03 RX ORDER — NITROGLYCERIN 20 MG/100ML
10-200 INJECTION INTRAVENOUS CONTINUOUS PRN
Status: DISCONTINUED | OUTPATIENT
Start: 2021-08-03 | End: 2021-08-03 | Stop reason: HOSPADM

## 2021-08-03 RX ORDER — POTASSIUM CHLORIDE 750 MG/1
40 TABLET, EXTENDED RELEASE ORAL
Status: DISCONTINUED | OUTPATIENT
Start: 2021-08-03 | End: 2021-08-03 | Stop reason: HOSPADM

## 2021-08-03 RX ORDER — HEPARIN SODIUM 1000 [USP'U]/ML
INJECTION, SOLUTION INTRAVENOUS; SUBCUTANEOUS
Status: DISCONTINUED | OUTPATIENT
Start: 2021-08-03 | End: 2021-08-03 | Stop reason: HOSPADM

## 2021-08-03 RX ORDER — FENTANYL CITRATE 50 UG/ML
INJECTION, SOLUTION INTRAMUSCULAR; INTRAVENOUS
Status: DISCONTINUED | OUTPATIENT
Start: 2021-08-03 | End: 2021-08-03 | Stop reason: HOSPADM

## 2021-08-03 RX ORDER — POTASSIUM CHLORIDE 750 MG/1
20 TABLET, EXTENDED RELEASE ORAL
Status: DISCONTINUED | OUTPATIENT
Start: 2021-08-03 | End: 2021-08-03 | Stop reason: HOSPADM

## 2021-08-03 RX ORDER — ARGATROBAN 1 MG/ML
350 INJECTION, SOLUTION INTRAVENOUS
Status: DISCONTINUED | OUTPATIENT
Start: 2021-08-03 | End: 2021-08-03 | Stop reason: HOSPADM

## 2021-08-03 RX ORDER — OXYCODONE HYDROCHLORIDE 10 MG/1
10 TABLET ORAL EVERY 4 HOURS PRN
Status: DISCONTINUED | OUTPATIENT
Start: 2021-08-03 | End: 2021-08-03 | Stop reason: HOSPADM

## 2021-08-03 RX ORDER — FLUMAZENIL 0.1 MG/ML
0.2 INJECTION, SOLUTION INTRAVENOUS
Status: DISCONTINUED | OUTPATIENT
Start: 2021-08-03 | End: 2021-08-03 | Stop reason: HOSPADM

## 2021-08-03 RX ORDER — ATROPINE SULFATE 0.1 MG/ML
0.5 INJECTION INTRAVENOUS
Status: DISCONTINUED | OUTPATIENT
Start: 2021-08-03 | End: 2021-08-03 | Stop reason: HOSPADM

## 2021-08-03 RX ORDER — NALOXONE HYDROCHLORIDE 0.4 MG/ML
0.2 INJECTION, SOLUTION INTRAMUSCULAR; INTRAVENOUS; SUBCUTANEOUS
Status: DISCONTINUED | OUTPATIENT
Start: 2021-08-03 | End: 2021-08-03 | Stop reason: HOSPADM

## 2021-08-03 RX ORDER — EPTIFIBATIDE 2 MG/ML
180 INJECTION, SOLUTION INTRAVENOUS EVERY 10 MIN PRN
Status: DISCONTINUED | OUTPATIENT
Start: 2021-08-03 | End: 2021-08-03 | Stop reason: HOSPADM

## 2021-08-03 RX ORDER — SODIUM CHLORIDE 9 MG/ML
INJECTION, SOLUTION INTRAVENOUS CONTINUOUS
Status: DISCONTINUED | OUTPATIENT
Start: 2021-08-03 | End: 2021-08-03 | Stop reason: HOSPADM

## 2021-08-03 RX ORDER — ASPIRIN 81 MG/1
243 TABLET, CHEWABLE ORAL ONCE
Status: COMPLETED | OUTPATIENT
Start: 2021-08-03 | End: 2021-08-03

## 2021-08-03 RX ORDER — ARGATROBAN 1 MG/ML
150 INJECTION, SOLUTION INTRAVENOUS
Status: DISCONTINUED | OUTPATIENT
Start: 2021-08-03 | End: 2021-08-03 | Stop reason: HOSPADM

## 2021-08-03 RX ORDER — HEPARIN SODIUM 10000 [USP'U]/100ML
100-1000 INJECTION, SOLUTION INTRAVENOUS CONTINUOUS PRN
Status: DISCONTINUED | OUTPATIENT
Start: 2021-08-03 | End: 2021-08-03 | Stop reason: HOSPADM

## 2021-08-03 RX ORDER — ACETAMINOPHEN 325 MG/1
650 TABLET ORAL EVERY 4 HOURS PRN
Status: DISCONTINUED | OUTPATIENT
Start: 2021-08-03 | End: 2021-08-03 | Stop reason: HOSPADM

## 2021-08-03 RX ORDER — EPTIFIBATIDE 2 MG/ML
2 INJECTION, SOLUTION INTRAVENOUS CONTINUOUS PRN
Status: DISCONTINUED | OUTPATIENT
Start: 2021-08-03 | End: 2021-08-03 | Stop reason: HOSPADM

## 2021-08-03 RX ORDER — OXYCODONE HYDROCHLORIDE 5 MG/1
5 TABLET ORAL EVERY 4 HOURS PRN
Status: DISCONTINUED | OUTPATIENT
Start: 2021-08-03 | End: 2021-08-03 | Stop reason: HOSPADM

## 2021-08-03 RX ORDER — DOPAMINE HYDROCHLORIDE 160 MG/100ML
2-20 INJECTION, SOLUTION INTRAVENOUS CONTINUOUS PRN
Status: DISCONTINUED | OUTPATIENT
Start: 2021-08-03 | End: 2021-08-03 | Stop reason: HOSPADM

## 2021-08-03 RX ADMIN — SODIUM CHLORIDE: 9 INJECTION, SOLUTION INTRAVENOUS at 12:20

## 2021-08-03 RX ADMIN — ASPIRIN 325 MG ORAL TABLET 325 MG: 325 PILL ORAL at 12:19

## 2021-08-03 ASSESSMENT — MIFFLIN-ST. JEOR: SCORE: 1317.21

## 2021-08-03 NOTE — DISCHARGE INSTRUCTIONS
Going Home after an Angioplasty or Stent Placement (Cardiac)  ______________________________________________      After you go home:    Have an adult stay with you for 24 hours.    Drink plenty of fluids.    You may eat your normal diet, unless your doctor tells you otherwise.    For 24 hours:    Relax and take it easy.    Do NOT smoke.    Do NOT make any important or legal decisions.    Do NOT drive or operate machines at home or at work.    Do NOT drink alcohol.    Remove the Band-Aid after 24 hours. If there is minor oozing, apply another Band-aid and remove it after 12 hours.    For 2 days, do NOT have sex or do any heavy exercise.    Do NOT take a bath, or use a hot tub or pool for at least 3 days. You may shower.    Care of wrist or arm site  It is normal to have soreness at the puncture site and mild tingling in your hand for up to 3 days.    For 2 days, do not use your hand or arm to support your weight (such as rising from a chair) or bend your wrist (such as lifting a garage door).    For 2 days, do not lift more than 5 pounds or exercise your arm (tennis, golf or bowling).    If you start bleeding from the site in your arm:    Sit down and press firmly on the site with your fingers for 10 minutes. Call your doctor as soon as you can.    If the bleeding stops, sit still and keep your wrist straight for 2 hours.    Medicines    If you have started taking Plavix or Effient, do not stop taking it until you talk to your heart doctor (cardiologist).    If you are on metformin (Glucophage), do not restart it until you have blood tests (within 2 to 3 days after discharge). When your doctor tells you it is safe, you may restart the metformin.    If you have stopped any other medicines, check with your nurse or provider about when to restart them.    Call 911 right away if you have bleeding that is heavy or does not stop.    Call your doctor if:    You have a large or growing hard lump around the site.    The  site is red, swollen, hot or tender.    Blood or fluid is draining from the site.    You have chills or a fever greater than 101 F (38 C).    Your leg or arm feels numb or cool.    You have hives, a rash or unusual itching.      [facility] [place]:  [phone] (7 days a week)

## 2021-08-03 NOTE — H&P
History and Physical: Cardiology Cath Lab Service    Tashi Santana MRN# [id number]   YOB: 1935 Age: 86 year old         Assessment and plan:   Mr. Santana is an 86-year-old gentleman with PMHx of HFrEF (EF 25-30%) s/p ICD (2010), moderate AI, AAA (4.3cm), and prostate cancer. He has been seen by Dr. Blair for worsening shortness of breath.     # Dyspnea on exertion  Patient underwent recent stress test that showed large area of infarction in the entire apical and inferolateral segments of the left ventricle associated with a small area of ace-infarct ischemia. Here for coronary angiogram for further evaluation.     - No contraindications noted, will move forward with coronary angiogram   - Patient will be admitted as OP to Obs unit post procedure, with plans to discharge home after post procedure orders have been met    Patient seen and discussed with Dr. Fitzpatrick.    Geeta Hernandez DNP, APRN, CNP  [facility] Cardiology Cath Lab Services  [phone]    HPI: Mr. Santana is an 86-year-old gentleman with PMHx of HFrEF (EF 25-30%) s/p ICD (2010), moderate AI, AAA (4.3cm), and prostate cancer. He has been seen by Dr. Blair for worsening shortness of breath.     Patient underwent recent stress test that showed large area of infarction in the entire apical and inferolateral segments of the left ventricle associated with a small area of ace-infarct ischemia.     Patient reports feeling well today, denies recent illness, chest pain, shortness of breath, abdominal pain, headache, vision change, or weakness. No major changes in health history since previous visit.     Past Medical History:   Diagnosis Date     Acquired hypothyroidism     hx of neck radiation 2017     Depression      Systolic congestive heart failure (H) 2012       Past Surgical History:   Procedure Laterality Date     CA ANESTH,PACEMAKER INSERTION         No current facility-administered medications on file prior to encounter.  amitriptyline (ELAVIL)  10 MG tablet, Take 20 mg by mouth At Bedtime  brimonidine (ALPHAGAN) 0.2 % ophthalmic solution, PLACE 1 DROP INTO THE LEFT EYE TWO TIMES A DAY  calcitonin, salmon, (MIACALCIN) 200 UNIT/ACT nasal spray, Spray 1 spray into one nostril alternating nostrils daily Alternate nostril each day.  cyanocobalamin (VITAMIN B-12) 1000 MCG tablet, Take by mouth daily  dorzolamide-timolol (COSOPT) 2-0.5 % ophthalmic solution, PLACE 1 DROP INTO THE LEFT EYE TWO TIMES DAILY  folic acid (FOLVITE) 1 MG tablet, Take 1 mg by mouth daily  furosemide (LASIX) 20 MG tablet, Take 0.5 tablets (10 mg) by mouth daily Use as needed daily for swelling for heart failure  lamoTRIgine (LAMICTAL) 200 MG tablet, Take 200 mg by mouth 2 times daily.  levothyroxine (SSYNTHROID,LEVOTHROID) 100 MCG tablet, Take 100 mcg by mouth daily.  lisinopril (ZESTRIL) 2.5 MG tablet, Take 1 tablet (2.5 mg) by mouth daily  melatonin 3 MG tablet, Take 1 mg by mouth nightly as needed for sleep  metoprolol succinate ER (TOPROL-XL) 100 MG 24 hr tablet, Take 50 mg by mouth At Bedtime   risperiDONE (RISPERDAL) 4 MG tablet, Take 2 mg by mouth At Bedtime   simvastatin (ZOCOR) 40 MG tablet, Take 20 mg by mouth At Bedtime   Vitamin D, Cholecalciferol, 25 MCG (1000 UT) TABS,   vortioxetine (TRINTELLIX) 10 MG tablet, Take 10 mg by mouth daily        Family History   Problem Relation Age of Onset     Lung Cancer Mother      Alzheimer Disease Father        Social History     Tobacco Use     Smoking status: Former Smoker     Smokeless tobacco: Never Used   Substance Use Topics     Alcohol use: No       No Known Allergies      ROS:   Skin: negative  Respiratory: Shortness of breath- with exertion  Cardiovascular: negative  Gastrointestinal: negative  Genitourinary: negative  Musculoskeletal: negative  Neurologic: negative  Hematologic/Lymphatic/Immunologic: negative  Endocrine: negative    Physical Examination:  Vitals: /67 (BP Location: Left arm)   Pulse 66   Temp 97.4  F  "(36.3  C) (Oral)   Resp 18   Ht 1.778 m (5' 10\")   Wt 62.6 kg (138 lb)   SpO2 99%   BMI 19.80 kg/m    BMI= Body mass index is 19.8 kg/m .    GENERAL APPEARANCE: healthy, alert and no distress  HEENT: no icterus, no xanthelasmas, normal pupil size and reaction, normal palate, mucosa moist  NECK: JVP 0 cm, brisk carotid upstroke bilaterally  CHEST: lungs clear to auscultation without rales, rhonchi or wheezes, no use of accessory muscles, no retractions  CARDIOVASCULAR: regular rhythm, normal S1 and S2, no S3 or S4 and no murmur, click or rub.  ABDOMEN: soft, non tender, without hepatosplenomegaly, no masses palpable, bowel sounds normal  EXTREMITIES: warm, no edema, DP/PT pulses 2+ bilaterally, no clubbing or cyanosis   NEURO: alert and oriented to person/place/time, normal speech, gait and affect  SKIN: no ecchymoses, no rashes      Laboratory:  CMP  Recent Labs   Lab 21  1032      POTASSIUM 4.2   CHLORIDE 107   CO2 26   ANIONGAP 2*   *   BUN 19   CR 0.96   GFRESTIMATED 72   ANSON 9.1     CBC  Recent Labs   Lab 21  1032   WBC 6.7   RBC 4.28*   HGB 13.0*   HCT 39.2*   MCV 92   MCH 30.4   MCHC 33.2   RDW 13.6          Lab Results   Component Value Date    TROPI 0.045 (H) 2018    TROPI 0.044 (H) 2018    TROPN 0.12 (H) 2014         EK/3/21      TTE: 21  Interpretation Summary  The LVEF is 25-30%. Grade II or moderate diastolic dysfunction.  Global right ventricular function is mildly reduced. The right ventricle is  normal size.  There is mild-moderate aortic regurgitation.  There is mild dilation at the level of the ascending aorta (4.0 cm, indexed  value 2.2 cm/m^2).  No pericardial effusion is present.  The left ventricular filling pressures are elevated.  This study was compared with the study from 3/23/2018. There has been further  dilation of the LV (LVEDD has increased from 6.0 to 6.6 cm) and the LVEF has  declined modestly on direct visual comparison. " There is no significant change  in the aortic calibers or aortic regurgitation.    NM lexiscan stress test: 7/13/21  Narrative     The nuclear stress test is abnormal.      There is a large area of infarction in the entire apical and   inferolateral segment(s) of the left ventricle associated with a small   area of ace-infarct ischemia.      Left ventricular function is severely reduced.      The left ventricular ejection fraction at rest is 29%.  The left   ventricular ejection fraction at stress is 36%.      Left ventricular enlargement is noted.      There is no prior study for comparison.

## 2021-08-03 NOTE — PRE-PROCEDURE
GENERAL PRE-PROCEDURE:     Written consent obtained?: Yes    Risks and benefits: Risks, benefits and alternatives were discussed    Consent given by:  Patient  Patient states understanding of procedure being performed: Yes    Patient's understanding of procedure matches consent: Yes    Procedure consent matches procedure scheduled: Yes    Expected level of sedation:  Moderate  Appropriately NPO:  Yes  Mallampati  :  Grade 2- soft palate, base of uvula, tonsillar pillars, and portion of posterior pharyngeal wall visible  Lungs:  Lungs clear with good breath sounds bilaterally  Heart:  Normal heart sounds and rate  History & Physical reviewed:  History and physical reviewed and no updates needed  Statement of review:  I have reviewed the lab findings, diagnostic data, medications, and the plan for sedation

## 2021-08-03 NOTE — PROGRESS NOTES
Prep and teaching complete for CORS/ANGIOGRAM; pt awake and alert, reports chronic pain upper back; reports not too bad today. Has ride post procedure, daughter Mikaela will drive pt home  Wife at bedside, will wait in [place] .   Consent and labs current.  Mepilex coccyx pad over bottom, skin red, no open areas. Up with cane short distances otherwise uses wheelchair for any longer distance.

## 2021-08-03 NOTE — PROGRESS NOTES
D/I/A: Pt roomed on 3C in bay 26.  Arrived via litter and accompanied by cathlab RN On/Off: On monitor.  VSSA.  Rhythm upon arrival Paced on monitor.  Denies pain or sob.  Reviewed activity restrictions and when to notify RN, ie-changes to breathing or increased chest pressure or chest pain.  CCL access:  L TR band 15 ml CMS intact  P: Continue to monitor status.  Discharge to home once meeting criteria.

## 2021-08-03 NOTE — Clinical Note
dry, intact, no bleeding and no hematoma. 6 Fr sheath removed from the LRA. TR band placed. See flowsheet for details.

## 2021-08-04 LAB
ATRIAL RATE - MUSE: 67 BPM
DIASTOLIC BLOOD PRESSURE - MUSE: NORMAL MMHG
INTERPRETATION ECG - MUSE: NORMAL
P AXIS - MUSE: NORMAL DEGREES
PR INTERVAL - MUSE: 124 MS
QRS DURATION - MUSE: 198 MS
QT - MUSE: 498 MS
QTC - MUSE: 526 MS
R AXIS - MUSE: 222 DEGREES
SYSTOLIC BLOOD PRESSURE - MUSE: NORMAL MMHG
T AXIS - MUSE: 62 DEGREES
VENTRICULAR RATE- MUSE: 67 BPM

## 2021-08-04 NOTE — PROGRESS NOTES
D/I/A:  Patient is tolerating liquids and foods, ambulating, urinating, puncture sites are stable ( no bleeding and no hematoma) and patient has a .  A+O x4 and making needs known.  CCL access sites C/D/I; no bleeding or hematoma left wrist ecchymotic, arm placed in sling; CMS intact.  VSSA.  SR on monitor.  IV access removed.  Education completed and outlined in AVS or handout: medications reviewed with patient.  Questions answered prior to discharge.  Belongings returned to patient at discharge.    P: Discharged to self care.  Patient to follow up with appts as per discharge instruction.    ADD: Pt and pt's wife instructed on all post procedural site care and precautions. Both verbalized understanding and denied any questions.

## 2021-08-05 ENCOUNTER — OFFICE VISIT (OUTPATIENT)
Dept: CARDIOLOGY | Facility: OTHER | Age: 86
End: 2021-08-05
Attending: INTERNAL MEDICINE
Payer: COMMERCIAL

## 2021-08-05 VITALS
HEART RATE: 72 BPM | HEIGHT: 69 IN | WEIGHT: 140.8 LBS | SYSTOLIC BLOOD PRESSURE: 118 MMHG | OXYGEN SATURATION: 95 % | DIASTOLIC BLOOD PRESSURE: 66 MMHG | TEMPERATURE: 96.5 F | RESPIRATION RATE: 18 BRPM | BODY MASS INDEX: 20.86 KG/M2

## 2021-08-05 DIAGNOSIS — R94.39 ABNORMAL CARDIOVASCULAR STRESS TEST: ICD-10-CM

## 2021-08-05 DIAGNOSIS — Z87.891 HISTORY OF TOBACCO ABUSE: ICD-10-CM

## 2021-08-05 DIAGNOSIS — R79.89 ELEVATED TROPONIN: ICD-10-CM

## 2021-08-05 DIAGNOSIS — I50.42 CHRONIC COMBINED SYSTOLIC AND DIASTOLIC CONGESTIVE HEART FAILURE (H): ICD-10-CM

## 2021-08-05 DIAGNOSIS — E78.2 MIXED HYPERLIPIDEMIA: Primary | ICD-10-CM

## 2021-08-05 DIAGNOSIS — T82.110S FAILURE OF PACEMAKER LEAD, SEQUELA: ICD-10-CM

## 2021-08-05 DIAGNOSIS — I25.10 CORONARY ARTERY DISEASE INVOLVING NATIVE CORONARY ARTERY OF NATIVE HEART WITHOUT ANGINA PECTORIS: ICD-10-CM

## 2021-08-05 DIAGNOSIS — Z98.890 HISTORY OF CARDIAC CATH: ICD-10-CM

## 2021-08-05 DIAGNOSIS — Z95.810 BIVENTRICULAR ICD (IMPLANTABLE CARDIOVERTER-DEFIBRILLATOR) IN PLACE: ICD-10-CM

## 2021-08-05 DIAGNOSIS — R79.89 TROPONIN LEVEL ELEVATED: ICD-10-CM

## 2021-08-05 DIAGNOSIS — I50.23 ACUTE ON CHRONIC SYSTOLIC CONGESTIVE HEART FAILURE (H): ICD-10-CM

## 2021-08-05 DIAGNOSIS — R06.09 DYSPNEA ON EXERTION: ICD-10-CM

## 2021-08-05 PROCEDURE — 99214 OFFICE O/P EST MOD 30 MIN: CPT | Performed by: INTERNAL MEDICINE

## 2021-08-05 PROCEDURE — G0463 HOSPITAL OUTPT CLINIC VISIT: HCPCS

## 2021-08-05 RX ORDER — FUROSEMIDE 20 MG
20 TABLET ORAL DAILY
Qty: 90 TABLET | Refills: 3 | Status: SHIPPED | OUTPATIENT
Start: 2021-08-05 | End: 2022-06-20

## 2021-08-05 RX ORDER — CARVEDILOL 6.25 MG/1
6.25 TABLET ORAL 2 TIMES DAILY WITH MEALS
Qty: 180 TABLET | Refills: 3 | Status: SHIPPED | OUTPATIENT
Start: 2021-08-05

## 2021-08-05 RX ORDER — LISINOPRIL 5 MG/1
5 TABLET ORAL DAILY
Qty: 90 TABLET | Refills: 3 | Status: SHIPPED | OUTPATIENT
Start: 2021-08-05 | End: 2021-11-30

## 2021-08-05 ASSESSMENT — PAIN SCALES - GENERAL: PAINLEVEL: NO PAIN (0)

## 2021-08-05 ASSESSMENT — MIFFLIN-ST. JEOR: SCORE: 1314.04

## 2021-08-05 NOTE — PROGRESS NOTES
[facility] HEART CARE   CARDIOLOGY PROGRESS NOTE     Chief Complaint   Patient presents with     Follow Up     3 month          Diagnosis:  1.  Dyspnea on exertion 2/2 #2.  2.  CHF systolic/diastolic at 25-30% and grade 2 on 7/13/2021. EF of 35-40% on 3/23/18.  3.  Mild to moderate aortic insufficiency on 7/13/2021.  4.  BiV ICD placed on 6/27/2010.  5.  Generalized weakness.  6.  Elevated troponin at 0.079 on 5/19/2018.  7.  Thoracic compression fractures on 2/19/2021.  8.  Pulmonary nodules.  9.  Hyperlipidemia.  10.  H/O state cancer.  11.  TAA.  12.  Hypothyroidism.  13.  Tremor.  14.  Abnormal stress test on 7/13/2021.  Cardiac cath at the [facility] and 7/20/2021 with mild disease.  No stent placed.  15.  Moderate restriction on 6/15/2021.    Assessment/Plan:    1.  He is euvolemic on physical exam but does have a chest x-ray and concerns for mild pulmonary congestion.  He has a history of a severely reduced EF, most recently of 35-40%.  He has been taking Lasix 10 mg as needed.  He is to take Lasix 20 mg daily.  2.  Salt restriction, fluid restriction, and daily weights.  This was discussed heavily.  3.  He has a history of a BiV-ICD, he will be set up to have this checked locally.  4.  We reviewed his stress test and cardiac catheterization.  His stress test was abnormal but his cath showed minimal disease, no stents.  5.  Am concerned that shortness of breath is related to both systolic and diastolic dysfunction.  We will be aggressive with medical management.  6.  Lasix 20 mg daily and not as needed.  7.  Stop metoprolol 50 mg XL daily.  8.  Start on Coreg 6.25 mg twice a day.  9.  Increase lisinopril from 2.5 mg daily to 5 mg daily.  10.  Consider spironolactone in the future pending kidney function and electrolyte tolerance.  11.  [facility], Entresto is not covered.  12.  Labs in 2 weeks.  13.  Follow-up in 2 to 3 months to review symptomatic improvement.      Interval  history:  Tashi is being seen in follow-up.  He had a stress test which was abnormal.  He was set up for cardiac catheterization completed at the [facility].  Thankfully, he only had minimal disease as outlined below.  He had no stents placed.  I believe his shortness of breath is related to both systolic and diastolic dysfunction.  He has not been taking Lasix.  He had been on 10 mg as needed and takes it very occasionally.  Thankfully, he does not have significant peripheral edema.  I suggested he take 20 mg regularly.  In addition, we made changes to guideline directed heart failure medications as outlined above.  He has a hard time being active secondary to balance issues and tremor.  He would like to be active.  He will need labs in 2 weeks to make sure this is not causing ill effects to his kidney function and electrolytes.  This was explained.  Also, I cautioned him with high levels of salt intake, fluid intake, and he should weigh himself on a daily basis.  He will be seen in the future with likely changes in his medications at that time.  [facility]      HPI:    Mr. Santana is being seen by cardiology for heart failure. He has been euvolemic but dyspneic on exertion.  [facility] to [facility] but secondary to age and distance, he has been granted care locally.     In his notes and [facility], they report his EF was 25% dating back to August, 2011.  Most recently, his EF was 30%.  He has had multiple echoes throughout the year with variable EF's.  His highest EF was 35-40% in 2018.  He has not had a normalized EF.  He has been dyspneic on exertion.  He states this has been going on for many years.  He describes it rather stable but bothersome.  He is willing to do a work-up today as deemed fit.  He has not had significant peripheral edema or concerns for fluid overload.  He has been taking Lasix 10 mg as needed.  He had a BiV-ICD  originally placed on 6/27/2010 with lead revision on 9/2010.  Placed for primary prevention.  NYHA classification 3.  He is also been on metoprolol 100 mg XL daily and lisinopril to 2.5 mg daily.  BNP of 787 on 2/19/2021.     He was recently hospitalized here at [facility] from 2/19/2021-2/20/2021 for acute on chronic systolic heart failure with improvement with mild diuresis.  Chest x-ray read as pneumonia but unlikely based on white count, lack of fever, negative procalcitonin, no clinical findings. Patient without obvious edema.  He was given IV Lasix and then continued on oral Lasix.     He also has a history of moderate aortic insufficiency noted most recently on his echo from 4/14/2021.  Patient is aware.  It was suggested he have a repeat echocardiogram in the few months to assess his heart function, ascending aorta, and valvular function.     Patient has a history of elevated troponin without heart disease.  He says he has never a stent or bypass.  He is dyspneic on exertion and stress testing ordered on 5/13/2021.  Troponin peaked at 0.079 on 5/19/2018.     This was explained today that he has an ascending aortic aneurysm up to 4.3 cm on 4/14/2021.  He understands he needs regular echocardiogram/imaging related to this finding.     He has history of prostate cancer which he states he was treated for.  PSA ordered on 5/13/2021.      Relevant testing:  Stress test on 7/13/2021:     The nuclear stress test is abnormal.      There is a large area of infarction in the entire apical and   inferolateral segment(s) of the left ventricle associated with a small area of ace-infarct ischemia.      Left ventricular function is severely reduced.      The left ventricular ejection fraction at rest is 29%.  The left   ventricular ejection fraction at stress is 36%.      Left ventricular enlargement is noted.      There is no prior study for comparison.     [facility]      Mild  non-obstructive coronary artery disease.    IFR of RCA 0.99 (non-significant).    PFTs on 6/15/2021:  Moderate restriction    [facility]:  Left ventricle mildly to moderately dilated.  EF 30%.  Mild mitral regurgitation.  Ascending aorta at 4.3 cm.  Right ventricle mildly enlarged.  Right ventricle systolic function mildly reduced.  Left atrium moderately dilated.  Mild to moderate aortic regurgitation.     ECHO 3/23/18:  Mild left ventricular dilation is present.  Moderately (EF 35-40%) reduced left ventricular function is present.  Inferolateral akinesis  Moderate aortic insufficiency is present.  Moderate dilatation of the aorta is present.  Ascending aorta 4.2 cm.  Moderate aortic insufficiency is present.         ICD-10-CM    1. Mixed hyperlipidemia  E78.2    2. Acute on chronic systolic congestive heart failure (H)  I50.23 lisinopril (ZESTRIL) 5 MG tablet     carvedilol (COREG) 6.25 MG tablet     furosemide (LASIX) 20 MG tablet     Basic metabolic panel     CBC with platelets     N terminal pro BNP outpatient     Magnesium   3. Dyspnea on exertion  R06.00 furosemide (LASIX) 20 MG tablet     Basic metabolic panel     CBC with platelets     N terminal pro BNP outpatient   4. Chronic combined systolic and diastolic congestive heart failure (H)  I50.42 carvedilol (COREG) 6.25 MG tablet     furosemide (LASIX) 20 MG tablet     Basic metabolic panel     CBC with platelets     N terminal pro BNP outpatient   5. Coronary artery disease involving native coronary artery of native heart without angina pectoris  I25.10    6. History of cardiac cath on 7/20/2021 at [facility]  Z98.890    7. Abnormal cardiovascular stress test on 7/13/2021  R94.39    8. Troponin level elevated  R77.8    9. 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 Elevated troponin 0.079 5/19/2018  R77.8    11. Biventricular ICD (implantable cardioverter-defibrillator) in place on 6/27/2010  Z95.810    12.  History of tobacco abuse  Z87.891        Past Medical History:   Diagnosis Date     Acquired hypothyroidism     hx of neck radiation 2017     Depression      Systolic congestive heart failure (H) 2012       Past Surgical History:   Procedure Laterality Date     CA ANESTH,PACEMAKER INSERTION         No Known Allergies    Current Outpatient Medications   Medication Sig Dispense Refill     carvedilol (COREG) 6.25 MG tablet Take 1 tablet (6.25 mg) by mouth 2 times daily (with meals) 180 tablet 3     furosemide (LASIX) 20 MG tablet Take 1 tablet (20 mg) by mouth daily 90 tablet 3     lisinopril (ZESTRIL) 5 MG tablet Take 1 tablet (5 mg) by mouth daily 90 tablet 3     brimonidine (ALPHAGAN) 0.2 % ophthalmic solution PLACE 1 DROP INTO THE LEFT EYE TWO TIMES A DAY  99     calcitonin, salmon, (MIACALCIN) 200 UNIT/ACT nasal spray Spray 1 spray into one nostril alternating nostrils daily Alternate nostril each day.       cyanocobalamin (VITAMIN B-12) 1000 MCG tablet Take by mouth daily       dorzolamide-timolol (COSOPT) 2-0.5 % ophthalmic solution PLACE 1 DROP INTO THE LEFT EYE TWO TIMES DAILY  99     folic acid (FOLVITE) 1 MG tablet Take 1 mg by mouth daily       lamoTRIgine (LAMICTAL) 200 MG tablet Take 200 mg by mouth 2 times daily.       levothyroxine (SSYNTHROID,LEVOTHROID) 100 MCG tablet Take 100 mcg by mouth daily.       melatonin 3 MG tablet Take 1 mg by mouth nightly as needed for sleep       risperiDONE (RISPERDAL) 4 MG tablet Take 2 mg by mouth At Bedtime        simvastatin (ZOCOR) 40 MG tablet Take 20 mg by mouth At Bedtime        Vitamin D, Cholecalciferol, 25 MCG (1000 UT) TABS        vortioxetine (TRINTELLIX) 10 MG tablet Take 10 mg by mouth daily         Social History     Socioeconomic History     Marital status:      Spouse name: Not on file     Number of children: Not on file     Years of education: Not on file     Highest education level: Not on file   Occupational History     Not on file   Tobacco  "Use     Smoking status: Former Smoker     Packs/day: 2.00     Years: 14.00     Pack years: 28.00     Types: Cigarettes     Quit date: 1970     Years since quittin.6     Smokeless tobacco: Never Used   Vaping Use     Vaping Use: Never used   Substance and Sexual Activity     Alcohol use: No     Drug use: Never     Sexual activity: Not Currently     Partners: Female   Other Topics Concern     Parent/sibling w/ CABG, MI or angioplasty before 65F 55M? Not Asked   Social History Narrative    Preload 2013     Social Determinants of Health     Financial Resource Strain:      Difficulty of Paying Living Expenses:    Food Insecurity:      Worried About Running Out of Food in the Last Year:      Ran Out of Food in the Last Year:    Transportation Needs:      Lack of Transportation (Medical):      Lack of Transportation (Non-Medical):    Physical Activity:      Days of Exercise per Week:      Minutes of Exercise per Session:    Stress:      Feeling of Stress :    Social Connections:      Frequency of Communication with Friends and Family:      Frequency of Social Gatherings with Friends and Family:      Attends [employer] Services:      Active Member of Clubs or Organizations:      Attends Club or Organization Meetings:      Marital Status:    Intimate Partner Violence:      Fear of Current or Ex-Partner:      Emotionally Abused:      Physically Abused:      Sexually Abused:        LAB RESULTS:   Allied Health/Nurse Visit on 2021   Component Date Value Ref Range Status     SARS CoV2 PCR 2021 Negative  Negative Final        Review of systems: Negative except that which was noted in the HPI.    Physical examination:  /66 (BP Location: Right arm, Patient Position: Sitting, Cuff Size: Adult Regular)   Pulse 72   Temp (!) 96.5  F (35.8  C) (Tympanic)   Resp 18   Ht 1.753 m (5' 9\")   Wt 63.9 kg (140 lb 12.8 oz)   SpO2 95%   BMI 20.79 kg/m      GENERAL APPEARANCE: healthy, alert and no " distress  HEENT: no icterus, no xanthelasmas, normal pupil size and reaction, no cyanosis.  NECK: no adenopathy, no asymmetry, masses.  CHEST: lungs clear to auscultation - no rales, rhonchi or wheezes, no use of accessory muscles, no retractions, respirations are unlabored, normal respiratory rate  CARDIOVASCULAR: regular rhythm, normal S1 with physiologic split S2, no S3 or S4 and no murmur, click or rub  EXTREMITIES: no clubbing, cyanosis or edema  NEURO: alert and oriented normal speech, and affect  VASC: No vascular bruits heard.  SKIN: no ecchymoses, no rashes        Thank you for allowing me to participate in the care of your patient. Please do not hesitate to contact me if you have any questions.     Federico Blair, DO

## 2021-08-05 NOTE — PATIENT INSTRUCTIONS
You were seen by  Federico Blair DO     1. Start the following medication:   furosemide (LASIX) 20 MG tablet        Sig - Route: Take 1 tablet (20 mg) by mouth daily       2. Start the following medication:  carvedilol (COREG) 6.25 MG tablet        Sig - Route: Take 1 tablet (6.25 mg) by mouth 2 times daily (with meals)       3. Stop your metoprolol.    4. Increase your lisinopril to the following:  lisinopril (ZESTRIL) 5 MG tablet        Sig - Route: Take 1 tablet (5 mg) by mouth daily       5. Laboratory blood work has been ordered for in 2 weeks.  You will be notified by phone call or [employer] message when the results are available.    6. Start a low sodium diet. Keep salt intake to 2,500 mg per day or less.    7. Please weigh yourself every morning.  Keep a log of these weights.  Call if you gain more than 3 pounds in one day or 5 pounds in one week, have increased shortness of breath, or have increased swelling in your legs, feet, ankles, or belly.  [phone]    8. Keep fluid intake to 2 liters per day or less.     You will follow up with [facility] in 2-3 months, sooner if needed.     Please call the cardiology office with problems, questions, or concerns at [phone].    If you experience chest pain, chest pressure, chest tightness, shortness of breath, fainting, lightheadedness, nausea, vomiting, or other concerning symptoms, please report to the Emergency Department or call 911. These symptoms may be emergent, and best treated in the Emergency Department.     Cardiology Nurses  LAYA Montes, LAYA Dave, LPN  [place], LPN  [facility] (Unit 3C)  [phone]

## 2021-08-09 ENCOUNTER — PATIENT OUTREACH (OUTPATIENT)
Dept: CARE COORDINATION | Facility: CLINIC | Age: 86
End: 2021-08-09

## 2021-08-09 NOTE — PROGRESS NOTES
Clinic Care Coordination Contact  Care Team Conversations    Called and talked with patient, talked with him in regards to getting enrolled with the heart failure program was recommended per Federico Blair DO patient declined at this time patient stated that he has had heat failure for may years and does not need education on this.  Will keep in touch with patient he has a follow up with Federico Blair DO on 11/11/2021.  Jyoti Lee RN on 8/9/2021 at 2:34 PM

## 2021-08-17 ENCOUNTER — TELEPHONE (OUTPATIENT)
Dept: CARDIOLOGY | Facility: OTHER | Age: 86
End: 2021-08-17

## 2021-08-17 NOTE — TELEPHONE ENCOUNTER
Pt is scheduled for labs this week to check on medication.  He just got the medication and is wondering if he should reschedule his appt to a later date.  Please call.    Flako Larkin on 8/17/2021 at 9:20 AM

## 2021-09-02 ENCOUNTER — LAB (OUTPATIENT)
Dept: LAB | Facility: OTHER | Age: 86
End: 2021-09-02
Attending: INTERNAL MEDICINE
Payer: COMMERCIAL

## 2021-09-02 DIAGNOSIS — R06.09 DYSPNEA ON EXERTION: ICD-10-CM

## 2021-09-02 DIAGNOSIS — I50.42 CHRONIC COMBINED SYSTOLIC AND DIASTOLIC CONGESTIVE HEART FAILURE (H): ICD-10-CM

## 2021-09-02 DIAGNOSIS — I50.23 ACUTE ON CHRONIC SYSTOLIC CONGESTIVE HEART FAILURE (H): ICD-10-CM

## 2021-09-02 LAB
ANION GAP SERPL CALCULATED.3IONS-SCNC: 7 MMOL/L (ref 3–14)
BUN SERPL-MCNC: 23 MG/DL (ref 7–25)
CALCIUM SERPL-MCNC: 9.7 MG/DL (ref 8.6–10.3)
CHLORIDE BLD-SCNC: 98 MMOL/L (ref 98–107)
CO2 SERPL-SCNC: 28 MMOL/L (ref 21–31)
CREAT SERPL-MCNC: 1.03 MG/DL (ref 0.7–1.3)
ERYTHROCYTE [DISTWIDTH] IN BLOOD BY AUTOMATED COUNT: 13.6 % (ref 10–15)
GFR SERPL CREATININE-BSD FRML MDRD: 66 ML/MIN/1.73M2
GLUCOSE BLD-MCNC: 116 MG/DL (ref 70–105)
HCT VFR BLD AUTO: 42.9 % (ref 40–53)
HGB BLD-MCNC: 14.4 G/DL (ref 13.3–17.7)
MAGNESIUM SERPL-MCNC: 2.1 MG/DL (ref 1.9–2.7)
MCH RBC QN AUTO: 30.1 PG (ref 26.5–33)
MCHC RBC AUTO-ENTMCNC: 33.6 G/DL (ref 31.5–36.5)
MCV RBC AUTO: 90 FL (ref 78–100)
NT-PROBNP SERPL-MCNC: 393 PG/ML (ref 0–100)
PLATELET # BLD AUTO: 177 10E3/UL (ref 150–450)
POTASSIUM BLD-SCNC: 4.3 MMOL/L (ref 3.5–5.1)
RBC # BLD AUTO: 4.79 10E6/UL (ref 4.4–5.9)
SODIUM SERPL-SCNC: 133 MMOL/L (ref 134–144)
WBC # BLD AUTO: 7.8 10E3/UL (ref 4–11)

## 2021-09-02 PROCEDURE — 85027 COMPLETE CBC AUTOMATED: CPT | Mod: ZL

## 2021-09-02 PROCEDURE — 36415 COLL VENOUS BLD VENIPUNCTURE: CPT | Mod: ZL

## 2021-09-02 PROCEDURE — 83880 ASSAY OF NATRIURETIC PEPTIDE: CPT | Mod: ZL

## 2021-09-02 PROCEDURE — 82565 ASSAY OF CREATININE: CPT | Mod: ZL

## 2021-09-02 PROCEDURE — 83735 ASSAY OF MAGNESIUM: CPT | Mod: ZL

## 2021-11-29 NOTE — PROGRESS NOTES
[facility] HEART CARE   CARDIOLOGY PROGRESS NOTE     Chief Complaint   Patient presents with     Follow Up     3 month          Diagnosis:  1.  IRELAND 2/2 #2.  2.  CHF systolic/diastolic at 25-30% and grade 2 on 7/13/2021. EF of 35-40% on 3/23/18. LVEDD has increased from 6.0 to 6.6 cm  3.  Mild to moderate aortic insufficiency on 7/13/2021.  4.  BiV ICD placed on 6/27/2010.  5.  Generalized weakness.  6.  Elevated troponin at 0.079 on 5/19/2018.  7.  Thoracic compression fractures on 2/19/2021.  8.  Pulmonary nodules.  9.  Hyperlipidemia-controlled.  10.  H/O prostate cancer.  11.  TAA at 4.0 cm on 7/13/21.  12.  Hypothyroidism.  13.  Tremor.  14.  Abnormal stress test on 7/13/2021. Cardiac cath at the [facility] on 8/3/2021 with mild disease. No stent placed.  15.  Moderate restriction on 6/15/2021.    Assessment/Plan:    1.  He is euvolemic on physical exam but does have a chest x-ray and concerns for mild pulmonary congestion.  He has a history of a severely reduced EF, most recently of 35-40%.  His chamber has increased from 6.0 cm to 6.6 cm.  During his last visit, his Lasix was changed from 10 mg daily as needed to 20 mg daily.  He has not been taking it consistently.  Again, we had a long discussion about taking 20 mg daily.  He reports willingness to be compliant today.  2.  Salt restriction, fluid restriction, and daily weights.  We discussed his fluid status and this treatment heavily.  Sounds like he has been over drinking in his fluids and this was discussed.  3.  He has a history of a BiV-ICD, and was referred last time to have his device checked.  We discussed that again today.  He is agreeable to having it checked locally.  4.  Related to heart failure, increase lisinopril from 5 to 10 mg daily.  Goal dose would be 40 mg daily.  5.  Continue Coreg, lisinopril, and Lasix for heart failure.  6.  We will plan for an echocardiogram in 6 months related to systolic heart failure, mild/moderate aortic insufficiency,  and ascending aortic aneurysm.  7.  Consider spironolactone in the future pending kidney function and electrolyte tolerance.  8.  [facility], Ent[facility]o is not [facility].  9.  Follow-up in 6 months after completion of echocardiogram.      Interval history:  Tashi is being seen in follow-up.  He had a stress test which was abnormal.  He was set up for cardiac catheterization completed at the [facility].  Thankfully, he only had minimal disease as outlined below.  He had no stents placed.  I believe his shortness of breath is related to both systolic and diastolic dysfunction.  He had been on Lasix 10 mg daily as needed.  During his last visit, we discussed taking 20 mg daily secondary to his large left ventricular chamber on diastolic assessment.  He was given 20 mg daily.  He has not been taking it consistently.  He has been taking it as needed.  Again, we discussed taking consistently which he will do.  Also, we discussed salt restriction, fluid restriction, and daily weights.  He drinks approximately x5, 12 ounce pops or glasses of water to a day, milk, and approximately x2 cups of coffee a day.  It was explained to him that this is more than he should be drinking.  He was encouraged to cut back.  He is also due for an echocardiogram in approximately 6 months related to systolic dysfunction, aortic valve insufficiency, and a mild ascending aortic aneurysm at 4.0 cm.  Again, as we talked the last time, he will be referred here locally to have his pacemaker checked.  [facility] and this was discussed.  To prevent driving all the way to the [facility], he will be set up here.  He has not any chest pain or anginal symptoms.      HPI:    Mr. Santana is being seen by cardiology for heart failure. He has been euvolemic but dyspneic on exertion.  [facility] as [facility] to the [facility] but secondary to age and distance, he has been granted care locally.     In his notes  and [facility], they report his EF was 25% dating back to August, 2011.  Most recently, his EF was 30%.  He has had multiple echoes throughout the year with variable EF's.  His highest EF was 35-40% in 2018.  He has not had a normalized EF.  He has been dyspneic on exertion.  He states this has been going on for many years.  He describes it rather stable but bothersome.  He is willing to do a work-up today as deemed fit.  He has not had significant peripheral edema or concerns for fluid overload.  He has been taking Lasix 10 mg as needed.  He had a BiV-ICD originally placed on 6/27/2010 with lead revision on 9/2010.  Placed for primary prevention.  NYHA classification 3.  He is also been on metoprolol 100 mg XL daily and lisinopril to 2.5 mg daily.  BNP of 787 on 2/19/2021.     He was recently hospitalized here at [facility] from 2/19/2021-2/20/2021 for acute on chronic systolic heart failure with improvement with mild diuresis.  Chest x-ray read as pneumonia but unlikely based on white count, lack of fever, negative procalcitonin, no clinical findings. Patient without obvious edema.  He was given IV Lasix and then continued on oral Lasix.     He also has a history of moderate aortic insufficiency noted most recently on his echo from 4/14/2021.  Patient is aware.  It was suggested he have a repeat echocardiogram in the few months to assess his heart function, ascending aorta, and valvular function.     Patient has a history of elevated troponin without heart disease.  He says he has never a stent or bypass.  He is dyspneic on exertion and stress testing ordered on 5/13/2021.  Troponin peaked at 0.079 on 5/19/2018.     This was explained today that he has an ascending aortic aneurysm up to 4.3 cm on 4/14/2021.  He understands he needs regular echocardiogram/imaging related to this finding.     He has history of prostate cancer which he states he was treated for.  PSA ordered on 5/13/2021.      Relevant  testing:  Stress test on 7/13/2021:     The nuclear stress test is abnormal.      There is a large area of infarction in the entire apical and   inferolateral segment(s) of the left ventricle associated with a small area of ace-infarct ischemia.      Left ventricular function is severely reduced.      The left ventricular ejection fraction at rest is 29%.  The left   ventricular ejection fraction at stress is 36%.      Left ventricular enlargement is noted.      There is no prior study for comparison.     Cardiac [facility] on [facility]/20[facility] at the [facility]:      Mild non-obstructive coronary artery disease.    IFR of RCA 0.99 (non-significant).    PFTs on 6/15/2021:  Moderate restriction    [facility]:  Left ventricle mildly to moderately dilated.  EF 30%.  Mild mitral regurgitation.  Ascending aorta at 4.3 cm.  Right ventricle mildly enlarged.  Right ventricle systolic function mildly reduced.  Left atrium moderately dilated.  Mild to moderate aortic regurgitation.     ECHO 3/23/18:  Mild left ventricular dilation is present.  Moderately (EF 35-40%) reduced left ventricular function is present.  Inferolateral akinesis  Moderate aortic insufficiency is present.  Moderate dilatation of the aorta is present.  Ascending aorta 4.2 cm.  Moderate aortic insufficiency is present.         ICD-10-CM    1. Dyspnea on exertion  R06.00 Echocardiogram Complete     lisinopril (ZESTRIL) 10 MG tablet     DISCONTINUED: lisinopril (ZESTRIL) 10 MG tablet   2. Acute on chronic systolic congestive heart failure (H)  I50.23 lisinopril (ZESTRIL) 10 MG tablet     DISCONTINUED: lisinopril (ZESTRIL) 10 MG tablet   3. Ascending aortic aneurysm (H)  I71.2 Echocardiogram Complete     lisinopril (ZESTRIL) 10 MG tablet     DISCONTINUED: lisinopril (ZESTRIL) 10 MG tablet   4. Abnormal cardiovascular stress test  R94.39 Echocardiogram Complete   5. Mixed hyperlipidemia  E78.2    6. Hypothyroidism, unspecified type  E03.9    7.  Biventricular ICD (implantable cardioverter-defibrillator) in place on 6/27/2010  Z95.810 Cardiac Device Check - In Clinic   8. Chronic combined systolic and diastolic congestive heart failure as low as 25% on 8/2011, most recently at 30% on 4/14/2021  I50.42 Echocardiogram Complete   9. Coronary artery disease involving native coronary artery of native heart without angina pectoris  I25.10    10. Moderate aortic insufficiency  I35.1 Echocardiogram Complete   11. Valvular heart disease  I38 Echocardiogram Complete   12. Compression fracture of thoracic vertebra on 2/19/2021  S22.000S    13. Generalized muscle weakness  M62.81    14. History of tobacco abuse  Z87.891    15. Elevated troponin 0.079 5/19/2018  R77.8    16. 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 Fall at home, subsequent encounter  W19.XXXD     Y92.009    18. History of cardiac cath on 7/20/2021 at the [facility]  Z98.890    19. History of prostate cancer  Z85.46        Past Medical History:   Diagnosis Date     Acquired hypothyroidism     hx of neck radiation 2017     Depression      Systolic congestive heart failure (H) 2012       Past Surgical History:   Procedure Laterality Date     CA ANESTH,PACEMAKER INSERTION       CV CORONARY ANGIOGRAM N/A 8/3/2021    Procedure: CV CORONARY ANGIOGRAM;  Surgeon: Hosea Retana MD;  Location: [facility] CARDIAC CATH LAB     CV FRACTIONAL FLOW RATIO WIRE N/A 8/3/2021    Procedure: Fractional Flow Ratio Wire;  Surgeon: Hosea Retana MD;  Location: [facility] CARDIAC CATH LAB       No Known Allergies    Current Outpatient Medications   Medication Sig Dispense Refill     brimonidine (ALPHAGAN) 0.2 % ophthalmic solution PLACE 1 DROP INTO THE LEFT EYE TWO TIMES A DAY  99     calcitonin, salmon, (MIACALCIN) 200 UNIT/ACT nasal spray Spray 1 spray into one nostril alternating nostrils daily Alternate nostril each day.       carvedilol (COREG) 6.25 MG tablet Take 1 tablet (6.25 mg) by mouth 2 times  daily (with meals) 180 tablet 3     cyanocobalamin (VITAMIN B-12) 1000 MCG tablet Take by mouth daily       dorzolamide-timolol (COSOPT) 2-0.5 % ophthalmic solution PLACE 1 DROP INTO THE LEFT EYE TWO TIMES DAILY  99     folic acid (FOLVITE) 1 MG tablet Take 1 mg by mouth daily       furosemide (LASIX) 20 MG tablet Take 1 tablet (20 mg) by mouth daily 90 tablet 3     levothyroxine (SSYNTHROID,LEVOTHROID) 100 MCG tablet Take 100 mcg by mouth daily.       lisinopril (ZESTRIL) 10 MG tablet Take 1 tablet (10 mg) by mouth daily 90 tablet 3     melatonin 3 MG tablet Take 1 mg by mouth nightly as needed for sleep       risperiDONE (RISPERDAL) 4 MG tablet Take 2 mg by mouth At Bedtime        simvastatin (ZOCOR) 40 MG tablet Take 20 mg by mouth At Bedtime        Vitamin D, Cholecalciferol, 25 MCG (1000 UT) TABS        vortioxetine (TRINTELLIX) 10 MG tablet Take 10 mg by mouth daily         Social History     Socioeconomic History     Marital status:      Spouse name: Not on file     Number of children: Not on file     Years of education: Not on file     Highest education level: Not on file   Occupational History     Not on file   Tobacco Use     Smoking status: Former Smoker     Packs/day: 2.00     Years: 14.00     Pack years: 28.00     Types: Cigarettes     Quit date: 1970     Years since quittin.9     Smokeless tobacco: Never Used   Vaping Use     Vaping Use: Never used   Substance and Sexual Activity     Alcohol use: No     Drug use: Never     Sexual activity: Not Currently     Partners: Female   Other Topics Concern     Parent/sibling w/ CABG, MI or angioplasty before 65F 55M? Not Asked   Social History Narrative    Preload 2013     Social Determinants of Health     Financial Resource Strain: Not on file   Food Insecurity: Not on file   Transportation Needs: Not on file   Physical Activity: Not on file   Stress: Not on file   Social Connections: Not on file   Intimate Partner Violence: Not on file    Housing Stability: Not on file       LAB RESULTS:   Allied Health/Nurse Visit on 07/30/2021   Component Date Value Ref Range Status     SARS CoV2 PCR 07/30/2021 Negative  Negative Final        Review of systems: Negative except that which was noted in the HPI.    Physical examination:  /72 (BP Location: Right arm, Patient Position: Sitting, Cuff Size: Adult Regular)   Pulse 57   Temp 96.9  F (36.1  C) (Tympanic)   Resp 16   Wt 63 kg (139 lb)   SpO2 98%   BMI 20.53 kg/m      GENERAL APPEARANCE: healthy, alert and no distress  HEENT: no icterus, no xanthelasmas, normal pupil size and reaction, no cyanosis.  NECK: no adenopathy, no asymmetry, masses.  CHEST: lungs clear to auscultation - no rales, rhonchi or wheezes, no use of accessory muscles, no retractions, respirations are unlabored, normal respiratory rate  CARDIOVASCULAR: regular rhythm, normal S1 with physiologic split S2, no S3 or S4 and no murmur, click or rub  EXTREMITIES: no clubbing, cyanosis or edema  NEURO: alert and oriented normal speech, and affect  VASC: No vascular bruits heard.  SKIN: no ecchymoses, no rashes        Thank you for allowing me to participate in the care of your patient. Please do not hesitate to contact me if you have any questions.     Federico Blair, DO

## 2021-11-30 ENCOUNTER — IMMUNIZATION (OUTPATIENT)
Dept: FAMILY MEDICINE | Facility: OTHER | Age: 86
End: 2021-11-30
Attending: FAMILY MEDICINE
Payer: MEDICARE

## 2021-11-30 ENCOUNTER — OFFICE VISIT (OUTPATIENT)
Dept: CARDIOLOGY | Facility: OTHER | Age: 86
End: 2021-11-30
Attending: INTERNAL MEDICINE
Payer: COMMERCIAL

## 2021-11-30 VITALS
HEART RATE: 57 BPM | WEIGHT: 139 LBS | SYSTOLIC BLOOD PRESSURE: 126 MMHG | TEMPERATURE: 96.9 F | BODY MASS INDEX: 20.53 KG/M2 | DIASTOLIC BLOOD PRESSURE: 72 MMHG | OXYGEN SATURATION: 98 % | RESPIRATION RATE: 16 BRPM

## 2021-11-30 DIAGNOSIS — I50.42 CHRONIC COMBINED SYSTOLIC AND DIASTOLIC CONGESTIVE HEART FAILURE (H): ICD-10-CM

## 2021-11-30 DIAGNOSIS — S22.000S COMPRESSION FRACTURE OF THORACIC VERTEBRA, UNSPECIFIED THORACIC VERTEBRAL LEVEL, SEQUELA: ICD-10-CM

## 2021-11-30 DIAGNOSIS — I38 VALVULAR HEART DISEASE: ICD-10-CM

## 2021-11-30 DIAGNOSIS — Z98.890 HISTORY OF CARDIAC CATH: ICD-10-CM

## 2021-11-30 DIAGNOSIS — I50.23 ACUTE ON CHRONIC SYSTOLIC CONGESTIVE HEART FAILURE (H): ICD-10-CM

## 2021-11-30 DIAGNOSIS — I71.21 ASCENDING AORTIC ANEURYSM (H): ICD-10-CM

## 2021-11-30 DIAGNOSIS — Z85.46 HISTORY OF PROSTATE CANCER: ICD-10-CM

## 2021-11-30 DIAGNOSIS — I35.1 MODERATE AORTIC INSUFFICIENCY: ICD-10-CM

## 2021-11-30 DIAGNOSIS — Z87.891 HISTORY OF TOBACCO ABUSE: ICD-10-CM
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
## 2021-11-30 DIAGNOSIS — T82.110S FAILURE OF PACEMAKER LEAD, SEQUELA: ICD-10-CM

## 2021-11-30 DIAGNOSIS — R79.89 ELEVATED TROPONIN: ICD-10-CM
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
## 2021-11-30 DIAGNOSIS — M62.81 GENERALIZED MUSCLE WEAKNESS: ICD-10-CM

## 2021-11-30 DIAGNOSIS — E78.2 MIXED HYPERLIPIDEMIA: ICD-10-CM

## 2021-11-30 DIAGNOSIS — E03.9 HYPOTHYROIDISM, UNSPECIFIED TYPE: ICD-10-CM

## 2021-11-30 DIAGNOSIS — R94.39 ABNORMAL CARDIOVASCULAR STRESS TEST: ICD-10-CM

## 2021-11-30 DIAGNOSIS — Z95.810 BIVENTRICULAR ICD (IMPLANTABLE CARDIOVERTER-DEFIBRILLATOR) IN PLACE: ICD-10-CM

## 2021-11-30 DIAGNOSIS — R06.09 DYSPNEA ON EXERTION: Primary | ICD-10-CM

## 2021-11-30 DIAGNOSIS — I25.10 CORONARY ARTERY DISEASE INVOLVING NATIVE CORONARY ARTERY OF NATIVE HEART WITHOUT ANGINA PECTORIS: ICD-10-CM

## 2021-11-30 PROCEDURE — 91300 PR COVID VAC PFIZER DIL RECON 30 MCG/0.3 ML IM: CPT

## 2021-11-30 PROCEDURE — G0463 HOSPITAL OUTPT CLINIC VISIT: HCPCS

## 2021-11-30 PROCEDURE — 99214 OFFICE O/P EST MOD 30 MIN: CPT | Performed by: INTERNAL MEDICINE

## 2021-11-30 RX ORDER — LISINOPRIL 10 MG/1
10 TABLET ORAL DAILY
Qty: 90 TABLET | Refills: 3 | Status: SHIPPED | OUTPATIENT
Start: 2021-11-30 | End: 2022-01-01

## 2021-11-30 RX ORDER — LISINOPRIL 10 MG/1
10 TABLET ORAL DAILY
Qty: 90 TABLET | Refills: 3 | Status: SHIPPED | OUTPATIENT
Start: 2021-11-30 | End: 2021-11-30

## 2021-11-30 ASSESSMENT — PAIN SCALES - GENERAL: PAINLEVEL: NO PAIN (0)

## 2021-11-30 NOTE — NURSING NOTE
"Chief Complaint   Patient presents with     Follow Up     3 month       FOOD SECURITY SCREENING QUESTIONS  Hunger Vital Signs:  Within the past 12 months we worried whether our food would run out before we got money to buy more. Never  Within the past 12 months the food we bought just didn't last and we didn't have money to get more. Never  Grisel Antonio LPN 11/30/2021 10:53 AM      Initial /72 (BP Location: Right arm, Patient Position: Sitting, Cuff Size: Adult Regular)   Pulse 57   Temp 96.9  F (36.1  C) (Tympanic)   Resp 16   Wt 63 kg (139 lb)   SpO2 98%   BMI 20.53 kg/m   Estimated body mass index is 20.53 kg/m  as calculated from the following:    Height as of 8/5/21: 1.753 m (5' 9\").    Weight as of this encounter: 63 kg (139 lb).  Medication Reconciliation: complete    Grisel Antonio LPN  "

## 2021-12-02 ENCOUNTER — TELEPHONE (OUTPATIENT)
Dept: CARDIOLOGY | Facility: OTHER | Age: 86
End: 2021-12-02
Payer: MEDICARE

## 2021-12-02 NOTE — TELEPHONE ENCOUNTER
Patient is wondering if he should have a device check done at [facility] as he seen the order that he is due the end of December.  [facility]  Per Federico Blair, DO he placed the order if patient wanted to not travel as far for device checks.  Wherever patient decides to go is just fine.  Patient notified of this and would like to come to [facility] for this.  He is aware he will get a phone call to schedule.  Ruma Ortiz RN......December 2, 2021...11:53 AM

## 2021-12-14 ENCOUNTER — HOSPITAL ENCOUNTER (OUTPATIENT)
Dept: CARDIOLOGY | Facility: OTHER | Age: 86
Discharge: HOME | End: 2021-12-14
Attending: INTERNAL MEDICINE | Admitting: INTERNAL MEDICINE
Payer: MEDICARE

## 2021-12-14 DIAGNOSIS — Z95.0 CARDIAC RESYNCHRONIZATION THERAPY PACEMAKER (CRT-P) IN PLACE: ICD-10-CM

## 2021-12-14 DIAGNOSIS — T82.110S FAILURE OF PACEMAKER LEAD, SEQUELA: ICD-10-CM

## 2021-12-14 DIAGNOSIS — Z95.810 BIVENTRICULAR ICD (IMPLANTABLE CARDIOVERTER-DEFIBRILLATOR) IN PLACE: ICD-10-CM

## 2021-12-14 DIAGNOSIS — I50.22 CHRONIC SYSTOLIC CONGESTIVE HEART FAILURE (H): ICD-10-CM

## 2021-12-14 DIAGNOSIS — I44.2 ATRIOVENTRICULAR BLOCK, COMPLETE (H): Primary | ICD-10-CM

## 2021-12-14 DIAGNOSIS — I50.23 ACUTE ON CHRONIC SYSTOLIC CONGESTIVE HEART FAILURE (H): ICD-10-CM

## 2021-12-14 PROCEDURE — 93284 PRGRMG EVAL IMPLANTABLE DFB: CPT | Performed by: INTERNAL MEDICINE

## 2021-12-14 NOTE — PATIENT INSTRUCTIONS
It was a pleasure to see you in clinic today.  Please do not hesitate to call with any questions or concerns.  We look forward to seeing you in clinic at your next device check in 6 months.    Omega Llanes, LAYA  Electrophysiology Nurse Clinician  [facility]    During Business Hours Please Call:  [phone]  After Hours Please Call:  [phone] - select option #[phone] and ask for job code [phone]

## 2021-12-16 LAB
MDC_IDC_LEAD_IMPLANT_DT: NORMAL
MDC_IDC_LEAD_LOCATION: NORMAL
MDC_IDC_LEAD_LOCATION_DETAIL_1: NORMAL
MDC_IDC_LEAD_MFG: NORMAL
MDC_IDC_LEAD_MODEL: NORMAL
MDC_IDC_LEAD_POLARITY_TYPE: NORMAL
MDC_IDC_LEAD_SERIAL: NORMAL
MDC_IDC_MSMT_BATTERY_DTM: NORMAL
MDC_IDC_MSMT_BATTERY_REMAINING_LONGEVITY: 126 MO
MDC_IDC_MSMT_BATTERY_STATUS: NORMAL
MDC_IDC_MSMT_LEADCHNL_LV_IMPEDANCE_VALUE: 677 OHM
MDC_IDC_MSMT_LEADCHNL_LV_PACING_THRESHOLD_AMPLITUDE: 0.7 V
MDC_IDC_MSMT_LEADCHNL_LV_PACING_THRESHOLD_PULSEWIDTH: 0.5 MS
MDC_IDC_MSMT_LEADCHNL_RA_IMPEDANCE_VALUE: 517 OHM
MDC_IDC_MSMT_LEADCHNL_RA_PACING_THRESHOLD_AMPLITUDE: 0.8 V
MDC_IDC_MSMT_LEADCHNL_RA_PACING_THRESHOLD_PULSEWIDTH: 0.4 MS
MDC_IDC_MSMT_LEADCHNL_RA_SENSING_INTR_AMPL: 4.1 MV
MDC_IDC_MSMT_LEADCHNL_RV_IMPEDANCE_VALUE: 459 OHM
MDC_IDC_MSMT_LEADCHNL_RV_PACING_THRESHOLD_AMPLITUDE: 0.6 V
MDC_IDC_MSMT_LEADCHNL_RV_PACING_THRESHOLD_PULSEWIDTH: 0.4 MS
MDC_IDC_PG_IMPLANT_DTM: NORMAL
MDC_IDC_PG_MFG: NORMAL
MDC_IDC_PG_MODEL: NORMAL
MDC_IDC_PG_SERIAL: NORMAL
MDC_IDC_PG_TYPE: NORMAL
MDC_IDC_SESS_CLINIC_NAME: NORMAL
MDC_IDC_SESS_DTM: NORMAL
MDC_IDC_SESS_TYPE: NORMAL
MDC_IDC_SET_BRADY_AT_MODE_SWITCH_MODE: NORMAL
MDC_IDC_SET_BRADY_AT_MODE_SWITCH_RATE: 170 {BEATS}/MIN
MDC_IDC_SET_BRADY_LOWRATE: 60 {BEATS}/MIN
MDC_IDC_SET_BRADY_MAX_SENSOR_RATE: 130 {BEATS}/MIN
MDC_IDC_SET_BRADY_MAX_TRACKING_RATE: 130 {BEATS}/MIN
MDC_IDC_SET_BRADY_MODE: NORMAL
MDC_IDC_SET_BRADY_PAV_DELAY_HIGH: 170 MS
MDC_IDC_SET_BRADY_PAV_DELAY_LOW: 170 MS
MDC_IDC_SET_BRADY_SAV_DELAY_HIGH: 100 MS
MDC_IDC_SET_BRADY_SAV_DELAY_LOW: 100 MS
MDC_IDC_SET_CRT_LVRV_DELAY: 40 MS
MDC_IDC_SET_CRT_PACED_CHAMBERS: NORMAL
MDC_IDC_SET_LEADCHNL_LV_PACING_AMPLITUDE: 2 V
MDC_IDC_SET_LEADCHNL_LV_PACING_ANODE_ELECTRODE_1: NORMAL
MDC_IDC_SET_LEADCHNL_LV_PACING_ANODE_LOCATION_1: NORMAL
MDC_IDC_SET_LEADCHNL_LV_PACING_CAPTURE_MODE: NORMAL
MDC_IDC_SET_LEADCHNL_LV_PACING_CATHODE_ELECTRODE_1: NORMAL
MDC_IDC_SET_LEADCHNL_LV_PACING_CATHODE_LOCATION_1: NORMAL
MDC_IDC_SET_LEADCHNL_LV_PACING_POLARITY: NORMAL
MDC_IDC_SET_LEADCHNL_LV_PACING_PULSEWIDTH: 0.5 MS
MDC_IDC_SET_LEADCHNL_LV_SENSING_ADAPTATION_MODE: NORMAL
MDC_IDC_SET_LEADCHNL_LV_SENSING_ANODE_ELECTRODE_1: NORMAL
MDC_IDC_SET_LEADCHNL_LV_SENSING_ANODE_LOCATION_1: NORMAL
MDC_IDC_SET_LEADCHNL_LV_SENSING_CATHODE_ELECTRODE_1: NORMAL
MDC_IDC_SET_LEADCHNL_LV_SENSING_CATHODE_LOCATION_1: NORMAL
MDC_IDC_SET_LEADCHNL_LV_SENSING_POLARITY: NORMAL
MDC_IDC_SET_LEADCHNL_LV_SENSING_SENSITIVITY: 1.5 MV
MDC_IDC_SET_LEADCHNL_RA_PACING_AMPLITUDE: 2 V
MDC_IDC_SET_LEADCHNL_RA_PACING_CAPTURE_MODE: NORMAL
MDC_IDC_SET_LEADCHNL_RA_PACING_POLARITY: NORMAL
MDC_IDC_SET_LEADCHNL_RA_PACING_PULSEWIDTH: 0.4 MS
MDC_IDC_SET_LEADCHNL_RA_SENSING_ADAPTATION_MODE: NORMAL
MDC_IDC_SET_LEADCHNL_RA_SENSING_POLARITY: NORMAL
MDC_IDC_SET_LEADCHNL_RA_SENSING_SENSITIVITY: 0.25 MV
MDC_IDC_SET_LEADCHNL_RV_PACING_AMPLITUDE: 2 V
MDC_IDC_SET_LEADCHNL_RV_PACING_CAPTURE_MODE: NORMAL
MDC_IDC_SET_LEADCHNL_RV_PACING_POLARITY: NORMAL
MDC_IDC_SET_LEADCHNL_RV_PACING_PULSEWIDTH: 0.4 MS
MDC_IDC_SET_LEADCHNL_RV_SENSING_ADAPTATION_MODE: NORMAL
MDC_IDC_SET_LEADCHNL_RV_SENSING_POLARITY: NORMAL
MDC_IDC_SET_LEADCHNL_RV_SENSING_SENSITIVITY: 1.5 MV
MDC_IDC_SET_ZONE_DETECTION_INTERVAL: 375 MS
MDC_IDC_SET_ZONE_TYPE: NORMAL
MDC_IDC_SET_ZONE_VENDOR_TYPE: NORMAL
MDC_IDC_STAT_BRADY_DTM_END: NORMAL
MDC_IDC_STAT_BRADY_DTM_START: NORMAL
MDC_IDC_STAT_BRADY_RA_PERCENT_PACED: 38 %
MDC_IDC_STAT_BRADY_RV_PERCENT_PACED: 94 %
MDC_IDC_STAT_CRT_LV_PERCENT_PACED: 94 %
MDC_IDC_STAT_EPISODE_RECENT_COUNT: 4
MDC_IDC_STAT_EPISODE_RECENT_COUNT_DTM_END: NORMAL
MDC_IDC_STAT_EPISODE_RECENT_COUNT_DTM_START: NORMAL
MDC_IDC_STAT_EPISODE_TOTAL_COUNT: 4
MDC_IDC_STAT_EPISODE_TOTAL_COUNT_DTM_END: NORMAL
MDC_IDC_STAT_EPISODE_TYPE: NORMAL
MDC_IDC_STAT_EPISODE_TYPE: NORMAL
MDC_IDC_STAT_EPISODE_VENDOR_TYPE: NORMAL
MDC_IDC_STAT_EPISODE_VENDOR_TYPE: NORMAL

## 2021-12-21 NOTE — PHARMACY - DISCHARGE MEDICATION RECONCILIATION AND EDUCATION
Pharmacy:  Discharge Counseling and Medication Reconciliation    Tashi Santana  [address] [place] [id number] [phone] (home)   86 year old male  PCP: Vipin Iqbal    Allergies: Patient has no known allergies.    Discharge Counseling:    Pharmacist met with patient (and/or family) today to review the medication portion of the After Visit Summary (with an emphasis on NEW medications) and to address patient's questions/concerns.    Summary of Education: met with patient to discuss new medications.  Patient told to take lasix daily until at target weight per MD.  All questions answered.    Materials Provided:  MedCounselor sheets printed from Clinical Pharmacology on: lisinopril    Discharge Medication Reconciliation:    It has been determined that the patient has an adequate supply of medications available or which can be obtained from the patient's preferred pharmacy, which HE/SHE has confirmed as: [facility]    Thank you for the consult.    Meg Black RPH........February 20, 2021 11:29 AM       [employer]

## 2022-01-01 ENCOUNTER — TELEPHONE (OUTPATIENT)
Dept: CARDIOLOGY | Facility: OTHER | Age: 87
End: 2022-01-01

## 2022-01-01 ENCOUNTER — HOSPITAL ENCOUNTER (OUTPATIENT)
Dept: CARDIOLOGY | Facility: OTHER | Age: 87
Discharge: HOME | End: 2022-12-05
Attending: INTERNAL MEDICINE | Admitting: INTERNAL MEDICINE
Payer: MEDICARE

## 2022-01-01 DIAGNOSIS — Z95.810 BIVENTRICULAR ICD (IMPLANTABLE CARDIOVERTER-DEFIBRILLATOR) IN PLACE: ICD-10-CM

## 2022-01-01 DIAGNOSIS — T82.110S FAILURE OF PACEMAKER LEAD, SEQUELA: ICD-10-CM

## 2022-01-01 DIAGNOSIS — I50.42 CHRONIC COMBINED SYSTOLIC AND DIASTOLIC CONGESTIVE HEART FAILURE (H): Primary | ICD-10-CM

## 2022-01-01 DIAGNOSIS — I71.21 ANEURYSM OF ASCENDING AORTA WITHOUT RUPTURE (H): ICD-10-CM

## 2022-01-01 DIAGNOSIS — Z95.810 BIVENTRICULAR ICD (IMPLANTABLE CARDIOVERTER-DEFIBRILLATOR) IN PLACE: Primary | ICD-10-CM

## 2022-01-01 DIAGNOSIS — R06.09 DYSPNEA ON EXERTION: ICD-10-CM

## 2022-01-01 DIAGNOSIS — I50.23 ACUTE ON CHRONIC SYSTOLIC CONGESTIVE HEART FAILURE (H): ICD-10-CM

## 2022-01-01 DIAGNOSIS — I50.9 ACUTE ON CHRONIC CONGESTIVE HEART FAILURE, UNSPECIFIED HEART FAILURE TYPE (H): ICD-10-CM

## 2022-01-01 LAB
MDC_IDC_LEAD_IMPLANT_DT: NORMAL
MDC_IDC_LEAD_LOCATION: NORMAL
MDC_IDC_LEAD_LOCATION_DETAIL_1: NORMAL
MDC_IDC_LEAD_MFG: NORMAL
MDC_IDC_LEAD_MODEL: NORMAL
MDC_IDC_LEAD_POLARITY_TYPE: NORMAL
MDC_IDC_LEAD_SERIAL: NORMAL
MDC_IDC_MSMT_BATTERY_DTM: NORMAL
MDC_IDC_MSMT_BATTERY_REMAINING_LONGEVITY: 96 MO
MDC_IDC_MSMT_BATTERY_STATUS: NORMAL
MDC_IDC_MSMT_LEADCHNL_LV_IMPEDANCE_VALUE: 676 OHM
MDC_IDC_MSMT_LEADCHNL_LV_PACING_THRESHOLD_AMPLITUDE: 0.9 V
MDC_IDC_MSMT_LEADCHNL_LV_PACING_THRESHOLD_PULSEWIDTH: 0.5 MS
MDC_IDC_MSMT_LEADCHNL_RA_IMPEDANCE_VALUE: 548 OHM
MDC_IDC_MSMT_LEADCHNL_RA_PACING_THRESHOLD_AMPLITUDE: 0.9 V
MDC_IDC_MSMT_LEADCHNL_RA_PACING_THRESHOLD_PULSEWIDTH: 0.4 MS
MDC_IDC_MSMT_LEADCHNL_RA_SENSING_INTR_AMPL: 3.2 MV
MDC_IDC_MSMT_LEADCHNL_RV_IMPEDANCE_VALUE: 425 OHM
MDC_IDC_MSMT_LEADCHNL_RV_PACING_THRESHOLD_AMPLITUDE: 0.7 V
MDC_IDC_MSMT_LEADCHNL_RV_PACING_THRESHOLD_PULSEWIDTH: 0.4 MS
MDC_IDC_PG_IMPLANT_DTM: NORMAL
MDC_IDC_PG_MFG: NORMAL
MDC_IDC_PG_MODEL: NORMAL
MDC_IDC_PG_SERIAL: NORMAL
MDC_IDC_PG_TYPE: NORMAL
MDC_IDC_SESS_CLINIC_NAME: NORMAL
MDC_IDC_SESS_DTM: NORMAL
MDC_IDC_SESS_TYPE: NORMAL
MDC_IDC_SET_BRADY_AT_MODE_SWITCH_MODE: NORMAL
MDC_IDC_SET_BRADY_AT_MODE_SWITCH_RATE: 170 {BEATS}/MIN
MDC_IDC_SET_BRADY_LOWRATE: 70 {BEATS}/MIN
MDC_IDC_SET_BRADY_MAX_SENSOR_RATE: 130 {BEATS}/MIN
MDC_IDC_SET_BRADY_MAX_TRACKING_RATE: 130 {BEATS}/MIN
MDC_IDC_SET_BRADY_MODE: NORMAL
MDC_IDC_SET_BRADY_PAV_DELAY_HIGH: 170 MS
MDC_IDC_SET_BRADY_PAV_DELAY_LOW: 170 MS
MDC_IDC_SET_BRADY_SAV_DELAY_HIGH: 100 MS
MDC_IDC_SET_BRADY_SAV_DELAY_LOW: 100 MS
MDC_IDC_SET_CRT_LVRV_DELAY: 40 MS
MDC_IDC_SET_CRT_PACED_CHAMBERS: NORMAL
MDC_IDC_SET_LEADCHNL_LV_PACING_AMPLITUDE: 2 V
MDC_IDC_SET_LEADCHNL_LV_PACING_ANODE_ELECTRODE_1: NORMAL
MDC_IDC_SET_LEADCHNL_LV_PACING_ANODE_LOCATION_1: NORMAL
MDC_IDC_SET_LEADCHNL_LV_PACING_CAPTURE_MODE: NORMAL
MDC_IDC_SET_LEADCHNL_LV_PACING_CATHODE_ELECTRODE_1: NORMAL
MDC_IDC_SET_LEADCHNL_LV_PACING_CATHODE_LOCATION_1: NORMAL
MDC_IDC_SET_LEADCHNL_LV_PACING_POLARITY: NORMAL
MDC_IDC_SET_LEADCHNL_LV_PACING_PULSEWIDTH: 0.5 MS
MDC_IDC_SET_LEADCHNL_LV_SENSING_ADAPTATION_MODE: NORMAL
MDC_IDC_SET_LEADCHNL_LV_SENSING_ANODE_ELECTRODE_1: NORMAL
MDC_IDC_SET_LEADCHNL_LV_SENSING_ANODE_LOCATION_1: NORMAL
MDC_IDC_SET_LEADCHNL_LV_SENSING_CATHODE_ELECTRODE_1: NORMAL
MDC_IDC_SET_LEADCHNL_LV_SENSING_CATHODE_LOCATION_1: NORMAL
MDC_IDC_SET_LEADCHNL_LV_SENSING_POLARITY: NORMAL
MDC_IDC_SET_LEADCHNL_LV_SENSING_SENSITIVITY: 1.5 MV
MDC_IDC_SET_LEADCHNL_RA_PACING_AMPLITUDE: 2 V
MDC_IDC_SET_LEADCHNL_RA_PACING_CAPTURE_MODE: NORMAL
MDC_IDC_SET_LEADCHNL_RA_PACING_POLARITY: NORMAL
MDC_IDC_SET_LEADCHNL_RA_PACING_PULSEWIDTH: 0.4 MS
MDC_IDC_SET_LEADCHNL_RA_SENSING_ADAPTATION_MODE: NORMAL
MDC_IDC_SET_LEADCHNL_RA_SENSING_POLARITY: NORMAL
MDC_IDC_SET_LEADCHNL_RA_SENSING_SENSITIVITY: 0.25 MV
MDC_IDC_SET_LEADCHNL_RV_PACING_AMPLITUDE: 2 V
MDC_IDC_SET_LEADCHNL_RV_PACING_CAPTURE_MODE: NORMAL
MDC_IDC_SET_LEADCHNL_RV_PACING_POLARITY: NORMAL
MDC_IDC_SET_LEADCHNL_RV_PACING_PULSEWIDTH: 0.4 MS
MDC_IDC_SET_LEADCHNL_RV_SENSING_ADAPTATION_MODE: NORMAL
MDC_IDC_SET_LEADCHNL_RV_SENSING_POLARITY: NORMAL
MDC_IDC_SET_LEADCHNL_RV_SENSING_SENSITIVITY: 1.5 MV
MDC_IDC_SET_ZONE_DETECTION_INTERVAL: 375 MS
MDC_IDC_SET_ZONE_TYPE: NORMAL
MDC_IDC_SET_ZONE_VENDOR_TYPE: NORMAL
MDC_IDC_STAT_AT_BURDEN_PERCENT: 0 %
MDC_IDC_STAT_BRADY_DTM_END: NORMAL
MDC_IDC_STAT_BRADY_DTM_START: NORMAL
MDC_IDC_STAT_BRADY_RA_PERCENT_PACED: 54 %
MDC_IDC_STAT_BRADY_RV_PERCENT_PACED: 97 %
MDC_IDC_STAT_CRT_LV_PERCENT_PACED: 97 %
MDC_IDC_STAT_CRT_PERCENT_PACED: 97 %
MDC_IDC_STAT_EPISODE_RECENT_COUNT: 2
MDC_IDC_STAT_EPISODE_RECENT_COUNT_DTM_END: NORMAL
MDC_IDC_STAT_EPISODE_RECENT_COUNT_DTM_START: NORMAL
MDC_IDC_STAT_EPISODE_TOTAL_COUNT: 6
MDC_IDC_STAT_EPISODE_TOTAL_COUNT_DTM_END: NORMAL
MDC_IDC_STAT_EPISODE_TYPE: NORMAL
MDC_IDC_STAT_EPISODE_TYPE: NORMAL
MDC_IDC_STAT_EPISODE_VENDOR_TYPE: NORMAL
MDC_IDC_STAT_EPISODE_VENDOR_TYPE: NORMAL

## 2022-01-01 PROCEDURE — 93284 PRGRMG EVAL IMPLANTABLE DFB: CPT | Performed by: INTERNAL MEDICINE

## 2022-01-01 RX ORDER — LISINOPRIL 10 MG/1
10 TABLET ORAL DAILY
Qty: 90 TABLET | Refills: 3 | Status: SHIPPED | OUTPATIENT
Start: 2022-01-01

## 2022-06-15 ENCOUNTER — TRANSFERRED RECORDS (OUTPATIENT)
Dept: HEALTH INFORMATION MANAGEMENT | Facility: OTHER | Age: 87
End: 2022-06-15

## 2022-06-15 LAB
ALT SERPL-CCNC: 21 U/L (ref 13–61)
AST SERPL-CCNC: 26 U/L (ref 15–37)
CREATININE (EXTERNAL): 1.1 MG/DL (ref 0.7–1.2)
GFR ESTIMATED (EXTERNAL): 65 ML/MIN/1.73M2
GLUCOSE (EXTERNAL): 110 MG/DL (ref 74–106)
HBA1C MFR BLD: 5.3 % (ref 4–6)
POTASSIUM (EXTERNAL): 4.6 MMOL/L (ref 3.5–5)
TRIGLYCERIDES (EXTERNAL): 65 MG/DL
TSH SERPL-ACNC: 0.72 UIU/ML (ref 0.35–4.94)

## 2022-06-16 ENCOUNTER — NURSE TRIAGE (OUTPATIENT)
Dept: CARDIOLOGY | Facility: OTHER | Age: 87
End: 2022-06-16
Payer: MEDICARE

## 2022-06-16 NOTE — TELEPHONE ENCOUNTER
S-(situation): Patient called stating that he has been having increased weakness and fatigue that has been going on for a month.      B-(background): Patient was seen by Dr. Blair on 11/30/2. Has a history of CHF.     A-(assessment): Patient states that he has been sleeping well at night and not napping during the day. Has been having increased weakness and fatigue and shortness of breath. Reported that his blood pressure was 144/60 the last time that he took it. Reported that his face is more stiff but he thinks that it is from his radiation that he had 3 years ago. Has been drinking 4 12 oz bottles of fluid per day.     R-(recommendations): Per the protocol patient was advised to come into the clinic and been seen by a cardiologist.Patient made appointment to come in on 06/20/22. Patient informed that if he has increased weakness, becomes more fatigued, states to have palpitations to come in to the ER and be evaluated.  Patient verbalized understanding and had no other questions or concurs.      Raquel Luis RN .............. 6/16/2022  4:07 PM          Reason for Disposition    Patient wants to be seen    Taking a medicine that could cause weakness (e.g., blood pressure medications, diuretics)    Additional Information    Negative: Severe difficulty breathing (e.g., struggling for each breath, speaks in single words)    Negative: Shock suspected (e.g., cold/pale/clammy skin, too weak to stand, low BP, rapid pulse)    Negative: Difficult to awaken or acting confused (e.g., disoriented, slurred speech)    Negative: Fainted > 15 minutes ago and still feels too weak or dizzy to stand    Negative: Sounds like a life-threatening emergency to the triager    Negative: SEVERE weakness (i.e., unable to walk or barely able to walk, requires support) and new onset or worsening    Negative: Difficulty breathing    Negative: Diarrhea is the main symptom    Negative: Vomiting is the main symptom    Negative: Recent heat  exposure, suspected cause of weakness    Negative: Has diabetes and weakness from low blood sugar (i.e., < 60 mg/dL or 3.5 mmol/L)    Negative: Weakness of the face, arm or leg on one side of the body    Negative: MODERATE weakness from poor fluid intake with no improvement after 2 hours of rest and fluids    Negative: Heart beating < 50 beats per minute OR > 140 beats per minute    Negative: Extra heartbeats OR irregular heart beating (i.e., 'palpitations')    Negative: Follows bleeding (e.g., from vomiting, rectum, vagina) (Exception: small transient weakness from sight of a small amount blood)    Negative: Bloody, black, or tarry bowel movements (Exception: chronic-unchanged  black-grey bowel movements and is taking iron pills or Pepto-bismol)    Negative: Patient sounds very sick or weak to the triager    Negative: Drinking very little and dehydration suspected (e.g., no urine > 12 hours, very dry mouth, very lightheaded)    Negative: MODERATE weakness (i.e., interferes with work, school, normal activities) and cause unknown (Exceptions: weakness with acute minor illness, or weakness from poor fluid intake)    Negative: Fever > 103 F (39.4 C) and not able to get the Fever down using CARE ADVICE    Negative: Fever > 100.0 F (37.8 C) and bedridden (e.g., nursing home patient, stroke, chronic illness, recovering from surgery)    Negative: Fever > 101 F (38.3 C) and over 60 years of age    Negative: Fever > 100.0 F (37.8 C) and diabetes mellitus or weak immune system (e.g., HIV positive, cancer chemo, splenectomy, organ transplant, chronic steroids)    Negative: Pale skin (pallor)    Negative: MODERATE weakness (i.e., interferes with work, school, normal activities) and persists > 3 days    Protocols used: WEAKNESS (GENERALIZED) AND FATIGUE-A-OH

## 2022-06-20 ENCOUNTER — OFFICE VISIT (OUTPATIENT)
Dept: CARDIOLOGY | Facility: OTHER | Age: 87
End: 2022-06-20
Attending: NURSE PRACTITIONER
Payer: COMMERCIAL

## 2022-06-20 VITALS
HEART RATE: 68 BPM | OXYGEN SATURATION: 95 % | TEMPERATURE: 97.4 F | DIASTOLIC BLOOD PRESSURE: 63 MMHG | RESPIRATION RATE: 26 BRPM | SYSTOLIC BLOOD PRESSURE: 104 MMHG | BODY MASS INDEX: 20.38 KG/M2 | WEIGHT: 138 LBS

## 2022-06-20 DIAGNOSIS — I77.810 ASCENDING AORTA DILATION (H): ICD-10-CM

## 2022-06-20 DIAGNOSIS — R06.02 CHRONIC SHORTNESS OF BREATH: ICD-10-CM

## 2022-06-20 DIAGNOSIS — I42.0 DILATED CARDIOMYOPATHY (H): ICD-10-CM

## 2022-06-20 DIAGNOSIS — I35.1 NONRHEUMATIC AORTIC VALVE INSUFFICIENCY: ICD-10-CM

## 2022-06-20 DIAGNOSIS — R53.82 CHRONIC FATIGUE: ICD-10-CM

## 2022-06-20 DIAGNOSIS — I50.42 CHRONIC COMBINED SYSTOLIC (CONGESTIVE) AND DIASTOLIC (CONGESTIVE) HEART FAILURE (H): Primary | ICD-10-CM

## 2022-06-20 DIAGNOSIS — M62.81 GENERALIZED MUSCLE WEAKNESS: ICD-10-CM

## 2022-06-20 PROCEDURE — 99214 OFFICE O/P EST MOD 30 MIN: CPT | Performed by: NURSE PRACTITIONER

## 2022-06-20 RX ORDER — FUROSEMIDE 20 MG
20 TABLET ORAL PRN
Qty: 90 TABLET | Refills: 3
Start: 2022-06-20

## 2022-06-20 ASSESSMENT — PAIN SCALES - GENERAL: PAINLEVEL: NO PAIN (0)

## 2022-06-20 NOTE — PROGRESS NOTES
[facility] HEART CARE   CARDIOLOGY PROGRESS NOTE    Tashi Santana   [address] [place] [address]    Vipin Iqbal     Chief Complaint   Patient presents with     Medication Reconciliation        HPI:   Mr. Santana is an 87 year old male who presents for cardiology follow-up to visit on 11/30/21 with Dr. Blair. Patient recently reported increased generalized weakness, shortness of breath and fatigue, increased symptoms over past two months.    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 Additional history of generalized weakness, history of thoracic compression fractures, pulmonary nodules, hypothyroidism, tremor and depression.    It was suspected that his chronic dyspnea was related to his combined heart failure.  Again, he had cardiac catheterization on 8/31/2021 at [facility] without obstructive CAD identified.  He has been on Lasix 20 mg daily for chronic combined heart failure.    [facility] dating back to [facility], 2011.  Most recently, his EF was 30%. He has described ongoing dyspnea for many years. He has not had significant peripheral edema or concerns for fluid overload.  He has been taking Lasix 10 mg as needed.  He had a BiV-ICD originally placed on 6/27/2010 with lead revision on 9/2010.  Placed for primary prevention. NYHA classification 3.  He is also been on metoprolol 100 mg XL daily and lisinopril to 2.5 mg daily.      He was hospitalized at [facility] from 2/19/2021-2/20/2021 for acute on chronic systolic heart failure with  improvement  following mild diuresis.  Chest x-ray read as pneumonia but unlikely based on white count, lack of fever, negative procalcitonin, no clinical findings. Patient without obvious edema.  He was given IV Lasix and then continued on oral Lasix.     He also has a history of moderate aortic insufficiency noted most recently on his echo from 4/14/2021. It was suggested he have a repeat echocardiogram in 6 months.    Last device check on 12/14/2021 with normal device function, 4 episodes of nonsustained VT, monomorphic.     Relevant testing:  Stress test on 7/13/2021:     The nuclear stress test is abnormal.      There is a large area of infarction in the entire apical and   inferolateral segment(s) of the left ventricle associated with a small area of ace-infarct ischemia.      Left ventricular function is severely reduced.      The left ventricular ejection fraction at rest is 29%.  The left   ventricular ejection fraction at stress is 36%.      Left ventricular enlargement is noted.      There is no prior study for comparison.      Cardiac cath on 7/28/2021 at [facility]:      Mild non-obstructive coronary artery disease.    IFR of RCA 0.99 (non-significant).     PFTs on 6/15/2021:  Moderate restriction     [facility]:  Left ventricle mildly to moderately dilated.  EF 30%.  Mild mitral regurgitation.  Ascending aorta at 4.3 cm.  Right ventricle mildly enlarged.  Right ventricle systolic function mildly reduced.  Left atrium moderately dilated.  Mild to moderate aortic regurgitation.     ECHO 3/23/18:  Mild left ventricular dilation is present.  Moderately (EF 35-40%) reduced left ventricular function is present.  Inferolateral akinesis  Moderate aortic insufficiency is present.  Moderate dilatation of the aorta is present.  Ascending aorta 4.2 cm.  Moderate aortic insufficiency is present.         PAST MEDICAL HISTORY:   Past Medical History:   Diagnosis Date     Acquired hypothyroidism      hx of neck radiation 2017     Depression      Systolic congestive heart failure (H)           FAMILY HISTORY:   Family History   Problem Relation Age of Onset     Lung Cancer Mother      Alzheimer Disease Father           PAST SURGICAL HISTORY:   Past Surgical History:   Procedure Laterality Date     CA ANESTH,PACEMAKER INSERTION       CV CORONARY ANGIOGRAM N/A 8/3/2021    Procedure: CV CORONARY ANGIOGRAM;  Surgeon: Hosea Retana MD;  Location:  HEART CARDIAC CATH LAB     CV FRACTIONAL FLOW RATIO WIRE N/A 8/3/2021    Procedure: Fractional Flow Ratio Wire;  Surgeon: Hosea Retana MD;  Location:  HEART CARDIAC CATH LAB          SOCIAL HISTORY:   Social History     Socioeconomic History     Marital status:    Tobacco Use     Smoking status: Former Smoker     Packs/day: 2.00     Years: 14.00     Pack years: 28.00     Types: Cigarettes     Quit date: 1970     Years since quittin.5     Smokeless tobacco: Never Used   Vaping Use     Vaping Use: Never used   Substance and Sexual Activity     Alcohol use: No     Drug use: Never     Sexual activity: Not Currently     Partners: Female   Social History Narrative    Preload 2013          CURRENT MEDICATIONS:   Current Outpatient Medications   Medication     brimonidine (ALPHAGAN) 0.2 % ophthalmic solution     calcitonin, salmon, (MIACALCIN) 200 UNIT/ACT nasal spray     carvedilol (COREG) 6.25 MG tablet     cyanocobalamin (VITAMIN B-12) 1000 MCG tablet     dorzolamide-timolol (COSOPT) 2-0.5 % ophthalmic solution     folic acid (FOLVITE) 1 MG tablet     furosemide (LASIX) 20 MG tablet     levothyroxine (SSYNTHROID,LEVOTHROID) 100 MCG tablet     lisinopril (ZESTRIL) 10 MG tablet     melatonin 3 MG tablet     risperiDONE (RISPERDAL) 4 MG tablet     simvastatin (ZOCOR) 40 MG tablet     Vitamin D, Cholecalciferol, 25 MCG (1000 UT) TABS     vortioxetine (TRINTELLIX) 10 MG tablet     No current facility-administered medications for this visit.      ALLERGIES:   No Known Allergies       ROS:   CONSTITUTIONAL: No reported fever or chills. No changes in weight.  ENT: No visual disturbance, ear ache, epistaxis or sore throat.   CARDIOVASCULAR: No chest pain, chest pressure or chest discomfort. No palpitations or lower extremity edema.   RESPIRATORY: Positive for chronic dyspnea. No cough, wheezing or hemoptysis. No orthopnea or PND.   GI: No reported abdominal pain, melena or hematochezia.   : No reported hematuria or dysuria.   NEUROLOGICAL: No lightheadedness, dizziness or syncope. Positive for paresthesias and generalized weakness.   HEMATOLOGIC: No history of anemia. No bleeding or excessive bruising. No history of blood clots.   MUSCULOSKELETAL: No new joint pain or swelling, no muscle pain.  ENDOCRINOLOGIC: No temperature intolerance. No hair or skin changes.  SKIN: No abnormal rashes or sores, no unusual itching.  PSYCHIATRIC: Positive for history of depression[facility].       PHYSICAL EXAM:   /63 (BP Location: Right arm, Patient Position: Sitting, Cuff Size: Adult Regular)   Pulse 68   Temp 97.4  F (36.3  C) (Tympanic)   Resp 26   Wt 62.6 kg (138 lb)   SpO2 95%   BMI 20.38 kg/m    GENERAL: The patient is a well-developed, well-nourished, in no apparent distress.  HEENT: Head is normocephalic and atraumatic. Eyes are symmetrical with normal visual tracking. No icterus, no xanthelasmas. Nares appeared normal without nasal drainage. Mucous membranes are moist, no cyanosis.  NECK: Supple, no cervical bruits, JVP not visible.   CHEST/ LUNGS: Lungs clear to auscultation, no rales, rhonchi or wheezes, no use of accessory muscles, no retractions, respirations unlabored and normal respiratory rate.   CARDIO: Regular rate and rhythm normal S1 with physiologic split S2, no S3 or S4 and no murmur, click or rub. Precordium quiet with normal PMI.    ABD: Abdomen is nondistended.   EXTREMITIES: No LE edema present.   MUSCULOSKELETAL: No visible  joint swelling.   NEUROLOGIC: Alert and oriented X3. Normal speech, gait and affect. No focal neurologic deficits.   SKIN: No jaundice. No rashes or visible skin lesions present. No ecchymosis.       LAB RESULTS:   Hospital Outpatient Visit on 12/14/2021   Component Date Value Ref Range Status     Date Time Interrogation Session 12/14/2021 20211214000000   Final     Implantable Pulse Generator Manufa* 12/14/2021 [employer]   Final     Implantable Pulse Generator Model 12/14/2021 U125 VALITUDE   Final     Implantable Pulse Generator Serial* 12/14/2021 [id number]   Final     Type Interrogation Session 12/14/2021 In Clinic   Final     Clinic Name 12/14/2021 [place] [facility]   Final     Implantable Pulse Generator Type 12/14/2021 Cardiac Resynchronization Therapy - Pacemaker   Final     Implantable Pulse Generator Implan* 12/14/2021 [id number]   Final     Implantable Lead  12/14/2021 Guidant   Final     Implantable Lead Model 12/14/2021 0181 [employer] [place] SG   Final     Implantable Lead Serial Number 12/14/2021 [id number]   Final     Implantable Lead Implant Date 12/14/2021 [id number]   Final     Implantable Lead Polarity Type 12/14/2021 Bipolar Lead   Final     Implantable Lead Location Detail 1 12/14/2021 UNKNOWN   Final     Implantable Lead Location 12/14/2021 Right Ventricle   Final     Implantable Lead  12/14/2021 Guidant   Final     Implantable Lead Model 12/14/2021 4136 Dextrus   Final     Implantable Lead Serial Number 12/14/2021 [id number]   Final     Implantable Lead Implant Date 12/14/2021 [id number]   Final     Implantable Lead Polarity Type 12/14/2021 Bipolar Lead   Final     Implantable Lead Location Detail 1 12/14/2021 UNKNOWN   Final     Implantable Lead Location 12/14/2021 Right Atrium   Final     Implantable Lead  12/14/2021 Guidant   Final     Implantable Lead Model 12/14/2021 4543 EasyTrak 2 IS-1, 90 cm   Final     Implantable Lead Serial Number 12/14/2021  [id number]   Final     Implantable Lead Implant Date 12/14/2021 [id number]   Final     Implantable Lead Polarity Type 12/14/2021 Bipolar Lead   Final     Implantable Lead Location Detail 1 12/14/2021 UNKNOWN   Final     Implantable Lead Location 12/14/2021 Left Ventricle   Final     Gerardo Setting Mode (NBG Code) 12/14/2021 DDDR   Final     Gerardo Setting Lower Rate Limit 12/14/2021 60  [beats]/min Final     Gerardo Setting Maximum Tracking Rate 12/14/2021 130  [beats]/min Final     Gerardo Setting Maximum Sensor Rate 12/14/2021 130  [beats]/min Final     Gerardo Setting MARTINA Delay Low 12/14/2021 100.0  ms Final     Gerardo Setting PAV Delay Low 12/14/2021 170.0  ms Final     Gerardo Setting PAV Delay High 12/14/2021 170.0  ms Final     Gerardo Setting MARTINA Delay High 12/14/2021 100.0  ms Final     Gerardo Setting AT Mode Switch Rate 12/14/2021 170  [beats]/min Final     Gerardo Setting AT Mode Switch Mode 12/14/2021 VDIR   Final     Lead Channel Setting Sensing Polar* 12/14/2021 Bipolar   Final     Lead Channel Setting Sensing Sensi* 12/14/2021 0.25  mV Final     Lead Channel Setting Sensing Adapt* 12/14/2021 Adaptive   Final     Lead Channel Setting Sensing Polar* 12/14/2021 Bipolar   Final     Lead Channel Setting Sensing Sensi* 12/14/2021 1.5  mV Final     Lead Channel Setting Sensing Adapt* 12/14/2021 Adaptive   Final     Lead Channel Setting Sensing Polar* 12/14/2021 Bipolar   Final     Lead Channel Setting Sensing Catho* 12/14/2021 Left Ventricle   Final     Lead Channel Setting Sensing Catho* 12/14/2021 Tip   Final     Lead Channel Setting Sensing Anode* 12/14/2021 Left Ventricle   Final     Lead Channel Setting Sensing Anode* 12/14/2021 Ring   Final     Lead Channel Setting Sensing Sensi* 12/14/2021 1.5  mV Final     Lead Channel Setting Sensing Adapt* 12/14/2021 Adaptive   Final     Ventricular chambers paced during * 12/14/2021 BiV   Final     CRT LV-RV Delay 12/14/2021 40.0  ms Final     Lead Channel Setting Pacing Polari*  12/14/2021 Bipolar   Final     Lead Channel Setting Pacing Pulse * 12/14/2021 0.4  ms Final     Lead Channel Setting Pacing Amplit* 12/14/2021 2.0  V Final     Lead Channel Setting Pacing Captur* 12/14/2021 Adaptive   Final     Lead Channel Setting Pacing Polari* 12/14/2021 Bipolar   Final     Lead Channel Setting Pacing Pulse * 12/14/2021 0.4  ms Final     Lead Channel Setting Pacing Amplit* 12/14/2021 2.0  V Final     Lead Channel Setting Pacing Captur* 12/14/2021 Adaptive   Final     Lead Channel Setting Pacing Polari* 12/14/2021 Bipolar   Final     Lead Channel Setting Pacing Anode * 12/14/2021 Left Ventricle   Final     Lead Channel Setting Pacing Anode * 12/14/2021 Ring   Final     Lead Channel Setting Sensing Catho* 12/14/2021 Left Ventricle   Final     Lead Channel Setting Sensing Catho* 12/14/2021 Tip   Final     Lead Channel Setting Pacing Pulse * 12/14/2021 0.5  ms Final     Lead Channel Setting Pacing Amplit* 12/14/2021 2.0  V Final     Lead Channel Setting Pacing Captur* 12/14/2021 Fixed Pacing   Final     Zone Setting Type Category 12/14/2021 VT   Final     Zone Setting Vendor Type Category 12/14/2021 VT   Final     Zone Setting Detection Interval 12/14/2021 375.0  ms Final     Lead Channel Impedance Value 12/14/2021 677.0  ohm Final     Lead Channel Pacing Threshold Ampl* 12/14/2021 0.7000  V Final     Lead Channel Pacing Threshold Puls* 12/14/2021 0.5  ms Final     Lead Channel Impedance Value 12/14/2021 517.0  ohm Final     Lead Channel Sensing Intrinsic Amp* 12/14/2021 4.1  mV Final     Lead Channel Pacing Threshold Ampl* 12/14/2021 0.8000  V Final     Lead Channel Pacing Threshold Puls* 12/14/2021 0.4  ms Final     Lead Channel Impedance Value 12/14/2021 459.0  ohm Final     Lead Channel Pacing Threshold Ampl* 12/14/2021 0.6000  V Final     Lead Channel Pacing Threshold Puls* 12/14/2021 0.4  ms Final     Battery Date Time of Measurements 12/14/2021 [id number]   Final     Battery Status  12/14/2021 Middle of Service   Final     Battery Remaining Longevity 12/14/2021 126  mo Final     Gerardo Statistic Date Time Start 12/14/2021 20211214144640   Final     Gerardo Statistic Date Time End 12/14/2021 20211214144640   Final     Gerardo Statistic RA Percent Paced 12/14/2021 38  % Final     Gerardo Statistic RV Percent Paced 12/14/2021 94  % Final     CRT Statistic LV Percent Paced 12/14/2021 94  % Final     Episode Statistic Total Count 12/14/2021 4   Final     Episode Statistic Type Category 12/14/2021 VT   Final     Episode Statistic Vendor Type Rebeca* 12/14/2021 NSVT   Final     Episode Statistic Total Date Time * 12/14/2021 20211214000000   Final     Episode Statistic Recent Count 12/14/2021 4   Final     Episode Statistic Type Category 12/14/2021 VT   Final     Episode Statistic Vendor Type Rebeca* 12/14/2021 NSVT   Final     Episode Statistic Recent Date Time* 12/14/2021 Thu Sep 16 05:11:42 CDT 2021   Final     Episode Statistic Recent Date Time* 12/14/2021 [id number]   Final           ASSESSMENT:   Tashi Santana presents for cardiology follow-up to visit on 11/30/21 with Dr. Blair. Patient recently reported increased generalized weakness, shortness of breath and fatigue, increased symptoms over past two months.  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 Additional history of generalized weakness, history of thoracic compression fractures, pulmonary nodules, hypothyroidism, tremor and depression.    1. Chronic combined systolic (congestive) and diastolic (congestive) heart failure (H)  2. Dilated  cardiomyopathy (H)  3. Nonrheumatic aortic valve insufficiency  4. Ascending aorta dilation (H)  5. Chronic shortness of breath  6. Generalized muscle weakness  7. Chronic fatigue    PLAN:   1. Patient with chronic combined heart failure, LVEF 25-30%, grade II diastolic dysfunction, LVEDD 6.6 cm (7/13/2021), NYHA FC III, stage D. Reviewed progression of heart failure to stage D associated with his symptoms. He is due for repeat echocardiogram which has been ordered, also to reassess AI progression.   2. He will continue on Carvedilol 6.25 mg BID and Lisinopril 10 mg daily.  He is tolerating his current neurohormonal blockade, we did review that an advanced stage heart failure, his pressures could drop may not tolerate the same doses of his current neurohormonal blockade.  3. He will start taking Lasix 20 mg only as needed, dry on exam today.   4. Offered PT for generalized weakness, improve function capacity. Patient declined. Admits that it is hard for him to come in, he typically spends his day in reclining chair and uses his walker to ambulate when needed.   5. He has chronic dyspnea, his oxygen saturations have remained normal-95% on room air today. Dyspnea related to heart failure and valve disease.  6[facility], records of the results have been requested for review.   7. He will follow-up with cardiology in 3 months, certainly sooner if needed.       Thank you for allowing me to participate in the care of your patient. Please do not hesitate to contact me if you have any questions.     Nereida Rodriguez, KAYLA CNP CHFN

## 2022-06-20 NOTE — NURSING NOTE
Pt presents to clinic today for a medication review. Patient states there are no changes to his medications but feels like his heart is doing worse, is weak, can barely walk, SOB, fatigued.     FOOD SECURITY SCREENING QUESTIONS:    The next two questions are to help us understand your food security.  If you are feeling you need any assistance in this area, we have resources available to support you today.    Hunger Vital Signs:  Within the past 12 months we worried whether our food would run out before we got money to buy more. Never  Within the past 12 months the food we bought just didn't last and we didn't have money to get more. Never            Medication Reconciliation: complete  Bria Garrido, MARCELO,LPN on 6/20/2022 at 3:14 PM

## 2022-06-20 NOTE — PATIENT INSTRUCTIONS
You were seen by  KAYLA Malin CNP     1. Change how you take your lasix to the following:  furosemide (LASIX) 20 MG tablet        Sig - Route: Take 1 tablet (20 mg) by mouth as needed        2. Echocardiogram has been ordered. You will be called to schedule this.  You will receive instructions for testing at that time.  You will be contacted with results.       You will follow up with [facility] in 3 months, sooner if needed.     Please call the cardiology office with problems, questions, or concerns at [phone].    If you experience chest pain, chest pressure, chest tightness, shortness of breath, fainting, lightheadedness, nausea, vomiting, or other concerning symptoms, please report to the Emergency Department or call 911. These symptoms may be emergent, and best treated in the Emergency Department.     Cardiology Nurses  LAYA Hendrix, MARCELO Orta LPN  [facility] (Unit [address])  [phone]

## 2022-06-22 ENCOUNTER — TELEPHONE (OUTPATIENT)
Dept: CARDIOLOGY | Facility: OTHER | Age: 87
End: 2022-06-22

## 2022-06-22 NOTE — TELEPHONE ENCOUNTER
August 9th is the earliest he can get in for an ECHO.    He just wanted to know this.   Thank you   Coretta Veloz on 6/22/2022 at 2:38 PM

## 2022-06-28 NOTE — TELEPHONE ENCOUNTER
"Per Nereida Rodriguez, APRN CNP: \"Ruma, Can we check with patient to see if they would be willing to travel to [place] if a sooner opening? If they would, I will order to [place] and see what the soonest date would be.   Thanks,   Nereida Rodriguez, APRN CNP\"    Called patient and he is already scheduled for his echocardiogram in [place] on 7/15/22.  Ruma Ortiz RN......June 28, 2022...4:29 PM   "

## 2022-07-15 ENCOUNTER — HOSPITAL ENCOUNTER (OUTPATIENT)
Dept: CARDIOLOGY | Facility: HOSPITAL | Age: 87
Discharge: HOME | End: 2022-07-15
Attending: NURSE PRACTITIONER | Admitting: INTERNAL MEDICINE
Payer: COMMERCIAL

## 2022-07-15 DIAGNOSIS — I50.42 CHRONIC COMBINED SYSTOLIC (CONGESTIVE) AND DIASTOLIC (CONGESTIVE) HEART FAILURE (H): ICD-10-CM

## 2022-07-15 DIAGNOSIS — I42.0 DILATED CARDIOMYOPATHY (H): ICD-10-CM

## 2022-07-15 DIAGNOSIS — I77.810 ASCENDING AORTA DILATION (H): ICD-10-CM

## 2022-07-15 DIAGNOSIS — R06.02 CHRONIC SHORTNESS OF BREATH: ICD-10-CM

## 2022-07-15 DIAGNOSIS — I35.1 NONRHEUMATIC AORTIC VALVE INSUFFICIENCY: ICD-10-CM

## 2022-07-15 LAB — LVEF ECHO: NORMAL

## 2022-07-15 PROCEDURE — 93306 TTE W/DOPPLER COMPLETE: CPT

## 2022-09-20 NOTE — TELEPHONE ENCOUNTER
Patient had an appointment with KAYLA Malin CNP this morning but he never checked in for the appointment and waited in the waiting room well past his appointment time.  He was offered to reschedule over KAYLA Malin CNP's lunch at 11:45AM or this afternoon at 2:45PM.  He was rescheduled for 11:45AM but then left prior to the visit.    I called patient to see if he would like to come at 2:45PM today.  He refused to reschedule and will just come to the ER if he isn't feeling well.  He states he went to check in at [address] window and was told to go to [facility].  He went to sit in Unit 2 but never checked in.  He said he checked with a few nurses a couple times while he was sitting there and they all told him they would come to get him.  Upon further investigation he came back to Unit 3 window saying noone had got him yet and the check in staff told him that he needed to check in at Unit 2 and they would come get him.    He told me he was tired of the bad care him and his wife have been getting.  He states he got his echocardiogram results 3 months later.  I asked him if it was his echocardiogram from 7/15/22 and he said yes.  I told him that I notified him of these results on 7/20/22.  He states he is tired of this follow up in 3 months stuff and isn't going to do that anymore just to keep us [[place]] going.  He refuses to reschedule with KAYLA Malin CNP and ended up hanging up on me.  KAYLA Malin CNP notified of this.  Ruma Ortiz RN......September 20, 2022...12:32 PM

## 2022-11-07 NOTE — TELEPHONE ENCOUNTER
Patient called requesting an appointment be made for an in-clinic device check.  The order in the system will  before patient can be seen.  He has had multiple appointments scheduled with [facility] but has cancelled.  Will [facility] place a new order or should patient be seen?  Leslee Loweyr on 2022 at 2:09 PM'

## 2022-11-09 NOTE — TELEPHONE ENCOUNTER
Patient just saw me in June 2022, can we get him put on our device nurse Pilar's schedule next time she is here?  Thanks,   KAYLA Buckley CNP

## 2022-12-05 NOTE — PATIENT INSTRUCTIONS
It was a pleasure to see you in clinic today, your next remote will be done by the [facility] and we will see you back in clinic in 6 mos.    Pilar Lorenzo RN    Electrophysiology Nurse Clinician  [facility]    During Business Hours Please Call:  [phone]  After Hours Please Call:  [phone] - select option #4 and ask for job code [id number]

## 2023-01-01 ENCOUNTER — HOSPITAL ENCOUNTER (EMERGENCY)
Facility: OTHER | Age: 88
End: 2023-07-19
Attending: FAMILY MEDICINE | Admitting: FAMILY MEDICINE
Payer: MEDICARE

## 2023-01-01 ENCOUNTER — HOSPITAL ENCOUNTER (OUTPATIENT)
Dept: CARDIOLOGY | Facility: OTHER | Age: 88
Discharge: HOME | End: 2023-06-05
Attending: INTERNAL MEDICINE | Admitting: INTERNAL MEDICINE
Payer: MEDICARE

## 2023-01-01 DIAGNOSIS — I46.9 CARDIOPULMONARY ARREST (H): ICD-10-CM

## 2023-01-01 DIAGNOSIS — T82.110S FAILURE OF PACEMAKER LEAD, SEQUELA: ICD-10-CM

## 2023-01-01 DIAGNOSIS — Z95.810 BIVENTRICULAR ICD (IMPLANTABLE CARDIOVERTER-DEFIBRILLATOR) IN PLACE: ICD-10-CM

## 2023-01-01 LAB
MDC_IDC_LEAD_IMPLANT_DT: NORMAL
MDC_IDC_LEAD_LOCATION: NORMAL
MDC_IDC_LEAD_LOCATION_DETAIL_1: NORMAL
MDC_IDC_LEAD_MFG: NORMAL
MDC_IDC_LEAD_MODEL: NORMAL
MDC_IDC_LEAD_POLARITY_TYPE: NORMAL
MDC_IDC_LEAD_SERIAL: NORMAL
MDC_IDC_MSMT_BATTERY_DTM: NORMAL
MDC_IDC_MSMT_BATTERY_REMAINING_LONGEVITY: 78 MO
MDC_IDC_MSMT_BATTERY_STATUS: NORMAL
MDC_IDC_MSMT_LEADCHNL_LV_IMPEDANCE_VALUE: 755 OHM
MDC_IDC_MSMT_LEADCHNL_LV_PACING_THRESHOLD_AMPLITUDE: 0.8 V
MDC_IDC_MSMT_LEADCHNL_LV_PACING_THRESHOLD_PULSEWIDTH: 0.5 MS
MDC_IDC_MSMT_LEADCHNL_LV_SENSING_INTR_AMPL: 23.4 MV
MDC_IDC_MSMT_LEADCHNL_RA_IMPEDANCE_VALUE: 532 OHM
MDC_IDC_MSMT_LEADCHNL_RA_PACING_THRESHOLD_AMPLITUDE: 0.7 V
MDC_IDC_MSMT_LEADCHNL_RA_PACING_THRESHOLD_PULSEWIDTH: 0.4 MS
MDC_IDC_MSMT_LEADCHNL_RA_SENSING_INTR_AMPL: 3.3 MV
MDC_IDC_MSMT_LEADCHNL_RV_IMPEDANCE_VALUE: 426 OHM
MDC_IDC_MSMT_LEADCHNL_RV_PACING_THRESHOLD_AMPLITUDE: 0.8 V
MDC_IDC_MSMT_LEADCHNL_RV_PACING_THRESHOLD_PULSEWIDTH: 0.4 MS
MDC_IDC_MSMT_LEADCHNL_RV_SENSING_INTR_AMPL: 5 MV
MDC_IDC_PG_IMPLANT_DTM: NORMAL
MDC_IDC_PG_MFG: NORMAL
MDC_IDC_PG_MODEL: NORMAL
MDC_IDC_PG_SERIAL: NORMAL
MDC_IDC_PG_TYPE: NORMAL
MDC_IDC_SESS_CLINIC_NAME: NORMAL
MDC_IDC_SESS_DTM: NORMAL
MDC_IDC_SESS_TYPE: NORMAL
MDC_IDC_SET_BRADY_AT_MODE_SWITCH_MODE: NORMAL
MDC_IDC_SET_BRADY_AT_MODE_SWITCH_RATE: 170 {BEATS}/MIN
MDC_IDC_SET_BRADY_LOWRATE: 70 {BEATS}/MIN
MDC_IDC_SET_BRADY_MAX_SENSOR_RATE: 130 {BEATS}/MIN
MDC_IDC_SET_BRADY_MAX_TRACKING_RATE: 130 {BEATS}/MIN
MDC_IDC_SET_BRADY_MODE: NORMAL
MDC_IDC_SET_BRADY_PAV_DELAY_HIGH: 170 MS
MDC_IDC_SET_BRADY_PAV_DELAY_LOW: 170 MS
MDC_IDC_SET_BRADY_SAV_DELAY_HIGH: 100 MS
MDC_IDC_SET_BRADY_SAV_DELAY_LOW: 100 MS
MDC_IDC_SET_CRT_LVRV_DELAY: 40 MS
MDC_IDC_SET_CRT_PACED_CHAMBERS: NORMAL
MDC_IDC_SET_LEADCHNL_LV_PACING_AMPLITUDE: 2 V
MDC_IDC_SET_LEADCHNL_LV_PACING_ANODE_ELECTRODE_1: NORMAL
MDC_IDC_SET_LEADCHNL_LV_PACING_ANODE_LOCATION_1: NORMAL
MDC_IDC_SET_LEADCHNL_LV_PACING_CAPTURE_MODE: NORMAL
MDC_IDC_SET_LEADCHNL_LV_PACING_CATHODE_ELECTRODE_1: NORMAL
MDC_IDC_SET_LEADCHNL_LV_PACING_CATHODE_LOCATION_1: NORMAL
MDC_IDC_SET_LEADCHNL_LV_PACING_POLARITY: NORMAL
MDC_IDC_SET_LEADCHNL_LV_PACING_PULSEWIDTH: 0.5 MS
MDC_IDC_SET_LEADCHNL_LV_SENSING_ADAPTATION_MODE: NORMAL
MDC_IDC_SET_LEADCHNL_LV_SENSING_ANODE_ELECTRODE_1: NORMAL
MDC_IDC_SET_LEADCHNL_LV_SENSING_ANODE_LOCATION_1: NORMAL
MDC_IDC_SET_LEADCHNL_LV_SENSING_CATHODE_ELECTRODE_1: NORMAL
MDC_IDC_SET_LEADCHNL_LV_SENSING_CATHODE_LOCATION_1: NORMAL
MDC_IDC_SET_LEADCHNL_LV_SENSING_POLARITY: NORMAL
MDC_IDC_SET_LEADCHNL_LV_SENSING_SENSITIVITY: 1.5 MV
MDC_IDC_SET_LEADCHNL_RA_PACING_AMPLITUDE: 2 V
MDC_IDC_SET_LEADCHNL_RA_PACING_CAPTURE_MODE: NORMAL
MDC_IDC_SET_LEADCHNL_RA_PACING_POLARITY: NORMAL
MDC_IDC_SET_LEADCHNL_RA_PACING_PULSEWIDTH: 0.4 MS
MDC_IDC_SET_LEADCHNL_RA_SENSING_ADAPTATION_MODE: NORMAL
MDC_IDC_SET_LEADCHNL_RA_SENSING_POLARITY: NORMAL
MDC_IDC_SET_LEADCHNL_RA_SENSING_SENSITIVITY: 0.25 MV
MDC_IDC_SET_LEADCHNL_RV_PACING_AMPLITUDE: 2 V
MDC_IDC_SET_LEADCHNL_RV_PACING_CAPTURE_MODE: NORMAL
MDC_IDC_SET_LEADCHNL_RV_PACING_POLARITY: NORMAL
MDC_IDC_SET_LEADCHNL_RV_PACING_PULSEWIDTH: 0.4 MS
MDC_IDC_SET_LEADCHNL_RV_SENSING_ADAPTATION_MODE: NORMAL
MDC_IDC_SET_LEADCHNL_RV_SENSING_POLARITY: NORMAL
MDC_IDC_SET_LEADCHNL_RV_SENSING_SENSITIVITY: 1.5 MV
MDC_IDC_SET_ZONE_DETECTION_INTERVAL: 375 MS
MDC_IDC_SET_ZONE_TYPE: NORMAL
MDC_IDC_SET_ZONE_VENDOR_TYPE: NORMAL
MDC_IDC_STAT_AT_BURDEN_PERCENT: 0 %
MDC_IDC_STAT_BRADY_DTM_END: NORMAL
MDC_IDC_STAT_BRADY_DTM_START: NORMAL
MDC_IDC_STAT_BRADY_RA_PERCENT_PACED: 64 %
MDC_IDC_STAT_BRADY_RV_PERCENT_PACED: 96 %
MDC_IDC_STAT_CRT_LV_PERCENT_PACED: 96 %
MDC_IDC_STAT_CRT_PERCENT_PACED: 96 %
MDC_IDC_STAT_EPISODE_RECENT_COUNT: 4
MDC_IDC_STAT_EPISODE_RECENT_COUNT_DTM_END: NORMAL
MDC_IDC_STAT_EPISODE_RECENT_COUNT_DTM_START: NORMAL
MDC_IDC_STAT_EPISODE_TOTAL_COUNT: 8
MDC_IDC_STAT_EPISODE_TOTAL_COUNT_DTM_END: NORMAL
MDC_IDC_STAT_EPISODE_TYPE: NORMAL
MDC_IDC_STAT_EPISODE_TYPE: NORMAL
MDC_IDC_STAT_EPISODE_VENDOR_TYPE: NORMAL
MDC_IDC_STAT_EPISODE_VENDOR_TYPE: NORMAL

## 2023-01-01 PROCEDURE — 93281 PM DEVICE PROGR EVAL MULTI: CPT | Performed by: INTERNAL MEDICINE

## 2023-01-01 PROCEDURE — 92950 HEART/LUNG RESUSCITATION CPR: CPT | Performed by: FAMILY MEDICINE

## 2023-01-01 PROCEDURE — 99285 EMERGENCY DEPT VISIT HI MDM: CPT | Mod: 25 | Performed by: FAMILY MEDICINE

## 2023-01-01 PROCEDURE — 93281 PM DEVICE PROGR EVAL MULTI: CPT | Mod: 26 | Performed by: INTERNAL MEDICINE

## 2023-01-01 PROCEDURE — 99283 EMERGENCY DEPT VISIT LOW MDM: CPT | Performed by: FAMILY MEDICINE

## 2023-01-01 PROCEDURE — 99283 EMERGENCY DEPT VISIT LOW MDM: CPT | Mod: 25 | Performed by: FAMILY MEDICINE

## 2023-01-01 ASSESSMENT — ACTIVITIES OF DAILY LIVING (ADL): ADLS_ACUITY_SCORE: 35

## 2023-06-05 NOTE — PATIENT INSTRUCTIONS
It was a pleasure to see you in clinic today, please do not hesitate to call us with any questions or concerns.  Your next automatic remote pacemaker check from home is scheduled for 9/13/2023, we will plan to see you back in clinic in 1 year.     Pilar Lorenzo RN    Electrophysiology Nurse Clinician  [facility]    During Business Hours Please Call:  [phone]  After Hours Please Call:  [phone] - select option #4 and ask for job code [phone]

## 2023-07-20 NOTE — ED TRIAGE NOTES
Pt arrives via MEDS-[facility], in cardiac arrest. Call went out for a fall and lift assist, on EMS arrival pt was pulseless. No CPR started prior to EMS arrival. CPR started at 1813. Pt intubated by EMS, asystole on arrival. Remained asystole in route, and on arrival to bay 2. 2 doses of epi given IO, and 1 dose of bicarb by EMS.     Triage Assessment     Row Name 07/19/23 [id number]       Triage Assessment (Adult)    Airway WDL X  Pt intubated on arrival       Cardiac WDL    Cardiac WDL --  CPR in progress.

## 2023-08-26 NOTE — ED PROVIDER NOTES
MDM Key Documentation Elements for Patient's Evaluation:  Differential diagnosis to include high risk considerations:    Escalation to admission/observation considered:    Discussions and management with other clinicians:    3a. Independent interpretation of testing performed by another health professional:    3b. Discussion of management or test interpretation with another health professional:   Independent interpretation of tests:  ordering and review of 1 test(s); review of 1 test result(s) ordered prior to this encounter  Discussion of test interpretations with radiology:    History obtained from source other than patient/assessment requiring an independent historian:    Review of non-ED records:  review of 1 prior external note(s) (see separate area of note for details)  Diagnostic tests considered but not ultimately performed/deferred:    Prescription medications considered but not prescribed:    Chronic conditions affecting care:    Care affected by social determinants of health:      Patient arrived in asystole.  Remained in asystole throughout resuscitation efforts.  ACLS protocols were followed.  Cardiac activity assessed per ultrasound, no cardiac activity identified.  Time of death declared.     Jerardo Palma MD  08/26/23 0830

## 2023-11-20 NOTE — TELEPHONE ENCOUNTER
Yes, generally labs should be scheduled 1 to 2 weeks after initiating the medication.     Dr. Blair             After verification patient notified and lab appointment rescheduled until 8/30/21.  Jolie Ventura LPN ....................8/17/2021   11:51 AM     No

## (undated) DEVICE — PACK HEART LEFT CUSTOM

## (undated) DEVICE — TUBING PRESSURE 30"

## (undated) DEVICE — CATH GUIDING BLUE YELLOW PTFE JR4 6FRX100CM 67008200

## (undated) DEVICE — STATLOCK PSI DEVICE

## (undated) DEVICE — CATH ANGIO SUPERTORQUE PLUS JL4 6FRX100CM 533620

## (undated) DEVICE — VALVE HEMOSTASIS .096" COPILOT MECH 1003331

## (undated) DEVICE — KIT HAND CONTROL ACIST 014644 AR-P54

## (undated) DEVICE — SHTH INTRO 0.021IN ID 6FR DIA

## (undated) DEVICE — MANIFOLD KIT ANGIO AUTOMATED 014613

## (undated) DEVICE — CATH ANGIO SUPERTORQUE PLUS JR4 6FRX100CM 533621

## (undated) DEVICE — GW VASC .035IN DIA 260CML 7CML 3 MM RADIUS J CURVE 502455

## (undated) DEVICE — GUIDEWIRE OPTOWIRE 3 W/O GAUGE FACTOR CONNECTOR F1032

## (undated) RX ORDER — FENTANYL CITRATE 50 UG/ML
INJECTION, SOLUTION INTRAMUSCULAR; INTRAVENOUS
Status: DISPENSED
Start: 2021-08-03

## (undated) RX ORDER — LIDOCAINE HYDROCHLORIDE 10 MG/ML
INJECTION, SOLUTION EPIDURAL; INFILTRATION; INTRACAUDAL; PERINEURAL
Status: DISPENSED
Start: 2021-08-03

## (undated) RX ORDER — ONDANSETRON 2 MG/ML
INJECTION INTRAMUSCULAR; INTRAVENOUS
Status: DISPENSED
Start: 2018-05-18

## (undated) RX ORDER — REGADENOSON 0.08 MG/ML
INJECTION, SOLUTION INTRAVENOUS
Status: DISPENSED
Start: 2021-07-13

## (undated) RX ORDER — NICARDIPINE HCL-0.9% SOD CHLOR 1 MG/10 ML
SYRINGE (ML) INTRAVENOUS
Status: DISPENSED
Start: 2021-08-03

## (undated) RX ORDER — HEPARIN SODIUM 1000 [USP'U]/ML
INJECTION, SOLUTION INTRAVENOUS; SUBCUTANEOUS
Status: DISPENSED
Start: 2021-08-03

## (undated) RX ORDER — ASPIRIN 325 MG
TABLET ORAL
Status: DISPENSED
Start: 2021-08-03

## (undated) RX ORDER — FENTANYL CITRATE 50 UG/ML
INJECTION, SOLUTION INTRAMUSCULAR; INTRAVENOUS
Status: DISPENSED
Start: 2018-05-18

## (undated) RX ORDER — ONDANSETRON 2 MG/ML
INJECTION INTRAMUSCULAR; INTRAVENOUS
Status: DISPENSED
Start: 2021-08-03

## (undated) RX ORDER — SODIUM CHLORIDE 9 MG/ML
INJECTION, SOLUTION INTRAVENOUS
Status: DISPENSED
Start: 2021-08-03

## (undated) RX ORDER — NITROGLYCERIN 5 MG/ML
VIAL (ML) INTRAVENOUS
Status: DISPENSED
Start: 2021-08-03

## (undated) RX ORDER — SODIUM CHLORIDE 9 MG/ML
INJECTION, SOLUTION INTRAVENOUS
Status: DISPENSED
Start: 2020-07-04

## (undated) RX ORDER — FUROSEMIDE 10 MG/ML
INJECTION INTRAMUSCULAR; INTRAVENOUS
Status: DISPENSED
Start: 2021-02-19